# Patient Record
Sex: MALE | Race: WHITE | Employment: OTHER
[De-identification: names, ages, dates, MRNs, and addresses within clinical notes are randomized per-mention and may not be internally consistent; named-entity substitution may affect disease eponyms.]

---

## 2017-01-04 RX ORDER — WARFARIN SODIUM 5 MG/1
TABLET ORAL
Qty: 90 TABLET | Refills: 3 | Status: SHIPPED | OUTPATIENT
Start: 2017-01-04 | End: 2017-09-11 | Stop reason: SDUPTHER

## 2017-01-04 RX ORDER — LISINOPRIL 2.5 MG/1
TABLET ORAL
Qty: 180 TABLET | Refills: 3 | Status: SHIPPED | OUTPATIENT
Start: 2017-01-04 | End: 2017-09-11 | Stop reason: SDUPTHER

## 2017-03-06 ENCOUNTER — TELEPHONE (OUTPATIENT)
Dept: CARDIOLOGY | Facility: CLINIC | Age: 77
End: 2017-03-06

## 2017-03-06 DIAGNOSIS — R07.89 PAIN, CHEST WALL: Primary | ICD-10-CM

## 2017-03-06 RX ORDER — METHYLPREDNISOLONE 4 MG/1
TABLET ORAL
Qty: 21 TABLET | Refills: 0 | Status: SHIPPED | OUTPATIENT
Start: 2017-03-06 | End: 2017-03-28 | Stop reason: ALTCHOICE

## 2017-03-13 ENCOUNTER — TELEPHONE (OUTPATIENT)
Dept: CARDIOLOGY CLINIC | Age: 77
End: 2017-03-13

## 2017-03-28 ENCOUNTER — HOSPITAL ENCOUNTER (OUTPATIENT)
Dept: PHARMACY | Age: 77
Setting detail: THERAPIES SERIES
Discharge: HOME | End: 2017-03-28
Payer: MEDICARE

## 2017-03-28 VITALS
WEIGHT: 171 LBS | DIASTOLIC BLOOD PRESSURE: 82 MMHG | HEART RATE: 68 BPM | BODY MASS INDEX: 26 KG/M2 | SYSTOLIC BLOOD PRESSURE: 126 MMHG

## 2017-03-28 DIAGNOSIS — I63.512 ACUTE ISCHEMIC LEFT MCA STROKE (HCC): ICD-10-CM

## 2017-03-28 LAB — INR BLD: 2.5

## 2017-03-28 PROCEDURE — G0463 HOSPITAL OUTPT CLINIC VISIT: HCPCS

## 2017-03-28 PROCEDURE — 85610 PROTHROMBIN TIME: CPT

## 2017-05-09 ENCOUNTER — HOSPITAL ENCOUNTER (OUTPATIENT)
Dept: PHARMACY | Age: 77
Setting detail: THERAPIES SERIES
Discharge: HOME | End: 2017-05-09
Payer: MEDICARE

## 2017-05-09 VITALS
BODY MASS INDEX: 26.15 KG/M2 | SYSTOLIC BLOOD PRESSURE: 114 MMHG | WEIGHT: 172 LBS | DIASTOLIC BLOOD PRESSURE: 72 MMHG | HEART RATE: 72 BPM

## 2017-05-09 DIAGNOSIS — I63.512 ACUTE ISCHEMIC LEFT MCA STROKE (HCC): ICD-10-CM

## 2017-05-09 LAB — INR BLD: 2.5

## 2017-05-09 PROCEDURE — 85610 PROTHROMBIN TIME: CPT

## 2017-05-09 PROCEDURE — 99211 OFF/OP EST MAY X REQ PHY/QHP: CPT

## 2017-05-09 RX ORDER — POTASSIUM CITRATE 10 MEQ/1
20 TABLET, EXTENDED RELEASE ORAL DAILY
COMMUNITY
Start: 2010-11-12 | End: 2017-11-28 | Stop reason: ALTCHOICE

## 2017-06-20 ENCOUNTER — HOSPITAL ENCOUNTER (OUTPATIENT)
Dept: PHARMACY | Age: 77
Setting detail: THERAPIES SERIES
Discharge: HOME | End: 2017-06-20
Payer: MEDICARE

## 2017-06-20 VITALS
DIASTOLIC BLOOD PRESSURE: 82 MMHG | SYSTOLIC BLOOD PRESSURE: 134 MMHG | BODY MASS INDEX: 26.37 KG/M2 | HEART RATE: 78 BPM | WEIGHT: 173.4 LBS

## 2017-06-20 DIAGNOSIS — I63.512 ACUTE ISCHEMIC LEFT MCA STROKE (HCC): ICD-10-CM

## 2017-06-20 LAB — INR BLD: 2.7

## 2017-06-20 PROCEDURE — 99211 OFF/OP EST MAY X REQ PHY/QHP: CPT

## 2017-06-20 PROCEDURE — 85610 PROTHROMBIN TIME: CPT

## 2017-08-01 ENCOUNTER — HOSPITAL ENCOUNTER (OUTPATIENT)
Dept: PHARMACY | Age: 77
Setting detail: THERAPIES SERIES
Discharge: HOME | End: 2017-08-01
Payer: MEDICARE

## 2017-08-01 VITALS
DIASTOLIC BLOOD PRESSURE: 82 MMHG | WEIGHT: 171.1 LBS | BODY MASS INDEX: 26.02 KG/M2 | HEART RATE: 72 BPM | SYSTOLIC BLOOD PRESSURE: 118 MMHG

## 2017-08-01 DIAGNOSIS — I63.512 ACUTE ISCHEMIC LEFT MCA STROKE (HCC): ICD-10-CM

## 2017-08-01 LAB — INR BLD: 2.3

## 2017-08-01 PROCEDURE — 85610 PROTHROMBIN TIME: CPT

## 2017-08-01 PROCEDURE — 99211 OFF/OP EST MAY X REQ PHY/QHP: CPT

## 2017-09-11 RX ORDER — LISINOPRIL 2.5 MG/1
TABLET ORAL
Qty: 180 TABLET | Refills: 3 | Status: SHIPPED | OUTPATIENT
Start: 2017-09-11 | End: 2018-01-02 | Stop reason: SDUPTHER

## 2017-09-11 RX ORDER — WARFARIN SODIUM 5 MG/1
TABLET ORAL
Qty: 90 TABLET | Refills: 3 | Status: SHIPPED | OUTPATIENT
Start: 2017-09-11 | End: 2017-10-23 | Stop reason: SDUPTHER

## 2017-09-12 ENCOUNTER — HOSPITAL ENCOUNTER (OUTPATIENT)
Dept: PHARMACY | Age: 77
Setting detail: THERAPIES SERIES
Discharge: HOME | End: 2017-09-12
Payer: MEDICARE

## 2017-09-12 VITALS
DIASTOLIC BLOOD PRESSURE: 84 MMHG | SYSTOLIC BLOOD PRESSURE: 155 MMHG | WEIGHT: 170.5 LBS | BODY MASS INDEX: 25.92 KG/M2 | HEART RATE: 72 BPM

## 2017-09-12 DIAGNOSIS — I63.512 ACUTE ISCHEMIC LEFT MCA STROKE (HCC): ICD-10-CM

## 2017-09-12 LAB — INR BLD: 2.3

## 2017-09-12 PROCEDURE — 85610 PROTHROMBIN TIME: CPT

## 2017-09-12 PROCEDURE — 99211 OFF/OP EST MAY X REQ PHY/QHP: CPT

## 2017-10-23 ENCOUNTER — HOSPITAL ENCOUNTER (OUTPATIENT)
Dept: PHARMACY | Age: 77
Setting detail: THERAPIES SERIES
Discharge: HOME | End: 2017-10-23
Payer: MEDICARE

## 2017-10-23 VITALS
HEART RATE: 86 BPM | WEIGHT: 173.6 LBS | BODY MASS INDEX: 26.4 KG/M2 | DIASTOLIC BLOOD PRESSURE: 82 MMHG | SYSTOLIC BLOOD PRESSURE: 140 MMHG

## 2017-10-23 DIAGNOSIS — I63.512 ACUTE ISCHEMIC LEFT MCA STROKE (HCC): ICD-10-CM

## 2017-10-23 LAB — INR BLD: 1.7

## 2017-10-23 PROCEDURE — 85610 PROTHROMBIN TIME: CPT

## 2017-10-23 PROCEDURE — 99211 OFF/OP EST MAY X REQ PHY/QHP: CPT

## 2017-10-23 RX ORDER — WARFARIN SODIUM 5 MG/1
TABLET ORAL
Qty: 90 TABLET | Refills: 3 | Status: SHIPPED
Start: 2017-10-23 | End: 2018-01-02 | Stop reason: SDUPTHER

## 2017-11-09 ENCOUNTER — TELEPHONE (OUTPATIENT)
Dept: CARDIOLOGY CLINIC | Age: 77
End: 2017-11-09

## 2017-11-10 ENCOUNTER — TELEPHONE (OUTPATIENT)
Dept: PHARMACY | Age: 77
End: 2017-11-10

## 2017-11-10 NOTE — TELEPHONE ENCOUNTER
Jovan Carter will have a tooth extracted at Dr. Gonzales/Hamilton's office in the near future. Since Jovan Carter has a history of stroke and would need to be bridged with therapeutic Lovenox if he went off his warfarin, I have recommended that we not stop the warfarin prior to tooth extraction. We will check his INR within 24 hours prior to the tooth extraction to ensure his INR is not supra-therapeutic prior to the procedure to minimize the risk for bleeding.

## 2017-11-10 NOTE — TELEPHONE ENCOUNTER
Contacted coumadin clinic talked with Benjamin Coreas  If needing held will be bridge Benjamin Coreas will Call [employer] and [employer]

## 2017-11-27 ENCOUNTER — HOSPITAL ENCOUNTER (OUTPATIENT)
Dept: GENERAL RADIOLOGY | Age: 77
Discharge: HOME | End: 2017-11-27
Payer: MEDICARE

## 2017-11-27 ENCOUNTER — HOSPITAL ENCOUNTER (OUTPATIENT)
Age: 77
Discharge: HOME | End: 2017-11-27
Payer: MEDICARE

## 2017-11-27 DIAGNOSIS — E55.9 VITAMIN D DEFICIENCY DISEASE: ICD-10-CM

## 2017-11-27 DIAGNOSIS — I10 ESSENTIAL HYPERTENSION: ICD-10-CM

## 2017-11-27 DIAGNOSIS — Z95.0 PACEMAKER: ICD-10-CM

## 2017-11-27 DIAGNOSIS — I48.91 ATRIAL FIBRILLATION, UNSPECIFIED TYPE (HCC): ICD-10-CM

## 2017-11-27 DIAGNOSIS — E78.5 HYPERLIPIDEMIA, UNSPECIFIED: ICD-10-CM

## 2017-11-27 LAB
ABSOLUTE EOS #: 0.2 K/UL (ref 0–0.4)
ABSOLUTE IMMATURE GRANULOCYTE: NORMAL K/UL (ref 0–0.3)
ABSOLUTE LYMPH #: 1.4 K/UL (ref 1–4.8)
ABSOLUTE MONO #: 0.6 K/UL (ref 0–1)
ALBUMIN SERPL-MCNC: 4 G/DL (ref 3.5–5.2)
ALBUMIN/GLOBULIN RATIO: NORMAL (ref 1–2.5)
ALP BLD-CCNC: 44 U/L (ref 40–129)
ALT SERPL-CCNC: 17 U/L (ref 5–41)
ANION GAP SERPL CALCULATED.3IONS-SCNC: 11 MMOL/L (ref 9–17)
AST SERPL-CCNC: 22 U/L
BASOPHILS # BLD: 0 % (ref 0–2)
BASOPHILS ABSOLUTE: 0 K/UL (ref 0–0.2)
BILIRUB SERPL-MCNC: 0.79 MG/DL (ref 0.3–1.2)
BUN BLDV-MCNC: 18 MG/DL (ref 8–23)
BUN/CREAT BLD: 17 (ref 9–20)
CALCIUM SERPL-MCNC: 9.6 MG/DL (ref 8.6–10.4)
CHLORIDE BLD-SCNC: 102 MMOL/L (ref 98–107)
CHOLESTEROL/HDL RATIO: 5
CHOLESTEROL: 200 MG/DL
CO2: 28 MMOL/L (ref 20–31)
CREAT SERPL-MCNC: 1.07 MG/DL (ref 0.7–1.2)
DIFFERENTIAL TYPE: YES
EKG ATRIAL RATE: 75 BPM
EKG P AXIS: 88 DEGREES
EKG P-R INTERVAL: 238 MS
EKG Q-T INTERVAL: 416 MS
EKG QRS DURATION: 152 MS
EKG QTC CALCULATION (BAZETT): 464 MS
EKG R AXIS: 15 DEGREES
EKG T AXIS: 9 DEGREES
EKG VENTRICULAR RATE: 75 BPM
EOSINOPHILS RELATIVE PERCENT: 3 % (ref 0–5)
GFR AFRICAN AMERICAN: >60 ML/MIN
GFR NON-AFRICAN AMERICAN: >60 ML/MIN
GFR SERPL CREATININE-BSD FRML MDRD: NORMAL ML/MIN/{1.73_M2}
GFR SERPL CREATININE-BSD FRML MDRD: NORMAL ML/MIN/{1.73_M2}
GLUCOSE BLD-MCNC: 98 MG/DL (ref 70–99)
HCT VFR BLD CALC: 47.7 % (ref 41–53)
HDLC SERPL-MCNC: 40 MG/DL
HEMOGLOBIN: 15.6 G/DL (ref 13.5–17.5)
IMMATURE GRANULOCYTES: NORMAL %
LDL CHOLESTEROL: 142 MG/DL (ref 0–130)
LYMPHOCYTES # BLD: 20 % (ref 13–44)
MAGNESIUM: 2.2 MG/DL (ref 1.6–2.6)
MCH RBC QN AUTO: 28.7 PG (ref 26–34)
MCHC RBC AUTO-ENTMCNC: 32.8 G/DL (ref 31–37)
MCV RBC AUTO: 87.6 FL (ref 80–100)
MONOCYTES # BLD: 8 % (ref 5–9)
PATIENT FASTING?: YES
PDW BLD-RTO: 13.9 % (ref 12.1–15.2)
PLATELET # BLD: 161 K/UL (ref 140–450)
PLATELET ESTIMATE: NORMAL
PMV BLD AUTO: NORMAL FL (ref 6–12)
POTASSIUM SERPL-SCNC: 4.2 MMOL/L (ref 3.7–5.3)
RBC # BLD: 5.45 M/UL (ref 4.5–5.9)
RBC # BLD: NORMAL 10*6/UL
SEG NEUTROPHILS: 69 % (ref 39–75)
SEGMENTED NEUTROPHILS ABSOLUTE COUNT: 5.1 K/UL (ref 2.1–6.5)
SODIUM BLD-SCNC: 141 MMOL/L (ref 135–144)
TOTAL PROTEIN: 7.1 G/DL (ref 6.4–8.3)
TRIGL SERPL-MCNC: 89 MG/DL
TSH SERPL DL<=0.05 MIU/L-ACNC: 1.75 MIU/L (ref 0.3–5)
VITAMIN D 25-HYDROXY: 35.1 NG/ML (ref 30–100)
VLDLC SERPL CALC-MCNC: ABNORMAL MG/DL (ref 1–30)
WBC # BLD: 7.3 K/UL (ref 3.5–11)
WBC # BLD: NORMAL 10*3/UL

## 2017-11-27 PROCEDURE — 80053 COMPREHEN METABOLIC PANEL: CPT

## 2017-11-27 PROCEDURE — 80061 LIPID PANEL: CPT

## 2017-11-27 PROCEDURE — 71020 XR CHEST STANDARD TWO VW: CPT

## 2017-11-27 PROCEDURE — 84443 ASSAY THYROID STIM HORMONE: CPT

## 2017-11-27 PROCEDURE — 36415 COLL VENOUS BLD VENIPUNCTURE: CPT

## 2017-11-27 PROCEDURE — 85025 COMPLETE CBC W/AUTO DIFF WBC: CPT

## 2017-11-27 PROCEDURE — 93005 ELECTROCARDIOGRAM TRACING: CPT

## 2017-11-27 PROCEDURE — 83735 ASSAY OF MAGNESIUM: CPT

## 2017-11-27 PROCEDURE — 82306 VITAMIN D 25 HYDROXY: CPT

## 2017-11-28 ENCOUNTER — HOSPITAL ENCOUNTER (OUTPATIENT)
Dept: PHARMACY | Age: 77
Setting detail: THERAPIES SERIES
Discharge: HOME | End: 2017-11-28
Payer: MEDICARE

## 2017-11-28 ENCOUNTER — OFFICE VISIT (OUTPATIENT)
Dept: CARDIOLOGY CLINIC | Age: 77
End: 2017-11-28
Payer: MEDICARE

## 2017-11-28 VITALS
DIASTOLIC BLOOD PRESSURE: 86 MMHG | SYSTOLIC BLOOD PRESSURE: 124 MMHG | WEIGHT: 172.9 LBS | BODY MASS INDEX: 26.29 KG/M2 | HEART RATE: 80 BPM

## 2017-11-28 VITALS
WEIGHT: 171 LBS | DIASTOLIC BLOOD PRESSURE: 90 MMHG | BODY MASS INDEX: 26 KG/M2 | OXYGEN SATURATION: 96 % | HEART RATE: 78 BPM | SYSTOLIC BLOOD PRESSURE: 160 MMHG

## 2017-11-28 DIAGNOSIS — I63.512 ACUTE ISCHEMIC LEFT MCA STROKE (HCC): ICD-10-CM

## 2017-11-28 DIAGNOSIS — E78.49 OTHER HYPERLIPIDEMIA: Primary | ICD-10-CM

## 2017-11-28 DIAGNOSIS — E55.9 VITAMIN D DEFICIENCY DISEASE: ICD-10-CM

## 2017-11-28 DIAGNOSIS — I48.20 CHRONIC ATRIAL FIBRILLATION (HCC): ICD-10-CM

## 2017-11-28 DIAGNOSIS — Z95.0 PACEMAKER: ICD-10-CM

## 2017-11-28 DIAGNOSIS — I10 ESSENTIAL HYPERTENSION: ICD-10-CM

## 2017-11-28 LAB — INR BLD: 2.3

## 2017-11-28 PROCEDURE — G8484 FLU IMMUNIZE NO ADMIN: HCPCS | Performed by: INTERNAL MEDICINE

## 2017-11-28 PROCEDURE — G8417 CALC BMI ABV UP PARAM F/U: HCPCS | Performed by: INTERNAL MEDICINE

## 2017-11-28 PROCEDURE — 1123F ACP DISCUSS/DSCN MKR DOCD: CPT | Performed by: INTERNAL MEDICINE

## 2017-11-28 PROCEDURE — G8427 DOCREV CUR MEDS BY ELIG CLIN: HCPCS | Performed by: INTERNAL MEDICINE

## 2017-11-28 PROCEDURE — 99214 OFFICE O/P EST MOD 30 MIN: CPT | Performed by: INTERNAL MEDICINE

## 2017-11-28 PROCEDURE — G8598 ASA/ANTIPLAT THER USED: HCPCS | Performed by: INTERNAL MEDICINE

## 2017-11-28 PROCEDURE — 85610 PROTHROMBIN TIME: CPT

## 2017-11-28 PROCEDURE — 4040F PNEUMOC VAC/ADMIN/RCVD: CPT | Performed by: INTERNAL MEDICINE

## 2017-11-28 PROCEDURE — 93288 INTERROG EVL PM/LDLS PM IP: CPT | Performed by: INTERNAL MEDICINE

## 2017-11-28 PROCEDURE — 99211 OFF/OP EST MAY X REQ PHY/QHP: CPT

## 2017-11-28 PROCEDURE — 1036F TOBACCO NON-USER: CPT | Performed by: INTERNAL MEDICINE

## 2017-11-28 NOTE — PROGRESS NOTES
Ov DR Donte Tam 1 year follow up  No chest pain or sob   pacemaker checked per medtronic  No hospitalizations or procedures  Since seen  Have tooth extraction done next week  Not stopping coumadin  Will see in 1 year will   Check pacer in 6 mths

## 2017-11-28 NOTE — PROGRESS NOTES
Fingerstick INR drawn per clinic protocol. Patient states no visible blood in urine and no black tarry stool. Denies any missed doses of warfarin. No change in diet, although Asia Sneed and Prescilla Gosselin have increased their meat consumption slightly. All medications reviewed without the bottles for accuracy. Asia Sneed is no longer using laxatives. Asia Sneed was on amoxicillin 250 mg 4 times daily for an infected tooth, but has been done with that for a \"few weeks. \"  Asia Sneed will see Dr. David Dumont after our visit today. Asia Sneed will have 1 tooth extraction by Dr. Melissa Spears on 12/5/17, warfarin will not be stopped prior to the procedure. Will continue current warfarin regimen and recheck INR in 1 week(s), 24 hours prior to tooth extraction on 12/5/17.

## 2017-12-01 NOTE — PROGRESS NOTES
Obey Arreola M.D. [address] [place], [place] [address]Felicity   [phone]          2017          Dale Banerjee DO  [facility] [address] [place] [address]    RE:  Trey Anna  : 1940    Dear Dr. Jeet Hernandez:    CHIEF COMPLAINT:  1. Paroxysmal atrial fibrillation. 2.  CVA. HISTORY OF PRESENT ILLNESS:  I had the pleasure of seeing Mr. Martha Burt in our office on 2017. He is a pleasant 44-year-old gentleman who has sick sinus syndrome with paroxysmal atrial fibrillation with RVR. Because of recurrent atrial fibrillation and bradycardia, he had a Medtronic MRI-compatible DDD pacemaker implanted on 2013 and has been on Toprol since that time. He was placed on Coumadin for paroxysmal atrial fibrillation. He also has a history of severe labile hypertension. On , he came in the emergency room with a CVA. He had stopped his Coumadin and switched to herbal supplements. He had a prolonged recovery but was able to regain his speech. He has mild weakness of his right arm but otherwise has been doing well. He has been on lisinopril, which he decreased to 2.5 mg once a day 2 years ago. He is on no statin despite his severe hyperlipidemia. He has never had a myocardial infarction or cardiac catheterizations. He has done well over the past year. He had a community screening for carotid and aortic disease, which was negative. He denies any chest pain or chest discomfort and remains active. His wife tries to limit his activities such as not climbing on the roof, but he manages to escape and do garcia, etc.    He has had no unusual shortness of breath. No PND or orthopnea. No syncope or near syncope. No lightheadedness or dizziness. Denies any palpitations. His pacemaker was checked and is still at beginning of life. He is atrial pacing 100% of the time with no ventricular pacing.     CARDIAC bruits. No lymphadenopathy or thyromegaly. CARDIOVASCULAR EXAM:  S1 and S2 were normal.  No S3 or S4. Soft systolic blowing type murmur. No diastolic murmur. PMI was normal.  No lift, thrust, or pericardial friction rub. LUNGS:  Quite clear to auscultation and percussion. ABDOMEN:  Soft and nontender. Good bowel sounds. The aorta was not enlarged. No hepatomegaly, splenomegaly. EXTREMITIES:  Good femoral pulses. Good pedal pulses. No pedal edema. Skin was warm and dry. No calf tenderness. Nail beds pink. Good cap refill. PULSES:  Bilateral symmetrical radial, brachial and carotid pulses. No carotid bruits. Good femoral and pedal pulses. NEUROLOGIC EXAM:  Within normal limits. PSYCHIATRIC EXAM:  Within normal limits. LABORATORY DATA:  From 11/27, sodium 141, potassium 4.2, BUN was 18, creatinine 1.07, GFR greater than 60, magnesium 2.2. Cholesterol 200 with an HDL of 40,  with triglycerides 89. ALT was 17, AST was 22. TSH was 1.75. Vitamin D was 35.1. White count 7.3, hemoglobin 15.7 with a platelet count of 872,370. EKG showed atrial paced rhythm with an intrinsic ventricular response with nonspecific ST changes and a right bundle-branch block. Chest x-ray was unremarkable. His pacer was interrogated. He has been atrial pacing 100% of the time. IMPRESSION:  1. Sick sinus syndrome. 2.  Status post MRI pacemaker implanted on 11/20/2013 with appropriate sensing and pacing, with him atrial pacing 100% of the time and ventricular pacing 0% of the time. 3.  Paroxysmal atrial fibrillation, on Coumadin. 4.  CVA on 04/19/2015 after he had stopped his Coumadin, was transferred to [facility] with dysphagia, which has resolved with mild weakness of his right arm.  5.  Hypertension, very labile. 6.  Normal LV function by history. 7.  Hyperlipidemia, untreated by his choice. 8.  Ventral hernia. PLAN:  1. See in 1 year in followup.   2.  Continue the same medications. DISCUSSION:  Mr. Ursula Dial overall is doing well. He has had no chest pain or chest discomfort to indicate we need to do any other testing at this time. He is essentially pacer-dependent pacing atrially 100% of the time. He does have multiple risk factors of CAD but again is asymptomatic and he does not wish to be on any statins for lipid management. I will see him in 1 year in followup. We will check his pacemaker in 6 months. If he would have any unusual shortness of breath, etc., I would want to see him immediately. Thank you very much for allowing me the privilege of seeing Mr. Ursula Dial. If you have any questions on my thoughts, please do not hesitate to contact me.     Sincerely,        Willow Johnson    D: 11/28/2017 9:27:43       T: 11/28/2017 12:23:45     [id number]  Job#: [id number]     Doc#: [id number]

## 2017-12-04 ENCOUNTER — HOSPITAL ENCOUNTER (OUTPATIENT)
Dept: PHARMACY | Age: 77
Setting detail: THERAPIES SERIES
Discharge: HOME | End: 2017-12-04
Payer: MEDICARE

## 2017-12-04 VITALS
HEART RATE: 77 BPM | DIASTOLIC BLOOD PRESSURE: 82 MMHG | BODY MASS INDEX: 26.46 KG/M2 | SYSTOLIC BLOOD PRESSURE: 137 MMHG | WEIGHT: 174 LBS

## 2017-12-04 DIAGNOSIS — I63.512 ACUTE ISCHEMIC LEFT MCA STROKE (HCC): ICD-10-CM

## 2017-12-04 LAB — INR BLD: 2.2

## 2017-12-04 PROCEDURE — 85610 PROTHROMBIN TIME: CPT

## 2017-12-04 PROCEDURE — 99211 OFF/OP EST MAY X REQ PHY/QHP: CPT

## 2017-12-04 NOTE — PROGRESS NOTES
Fingerstick INR drawn per clinic protocol. Patient states no visible blood in urine and no black tarry stool. Denies any missed doses of warfarin. No change in other maintenance medications or in diet. Donny Cabrera is preparing for a single tooth extraction with Dr. Maren Carolina tomorrow. Since Diego's INR is on the lower end of therapeutic, he is okay to have tooth extraction and will take warfarin 5 mg tonight, then will continue current weekly warfarin regimen and recheck INR in 6 week(s).

## 2018-01-02 RX ORDER — LISINOPRIL 2.5 MG/1
TABLET ORAL
Qty: 180 TABLET | Refills: 3 | Status: SHIPPED | OUTPATIENT
Start: 2018-01-02 | End: 2019-01-15 | Stop reason: SDUPTHER

## 2018-01-02 RX ORDER — WARFARIN SODIUM 5 MG/1
TABLET ORAL
Qty: 90 TABLET | Refills: 3 | Status: SHIPPED | OUTPATIENT
Start: 2018-01-02 | End: 2018-05-22 | Stop reason: DRUGHIGH

## 2018-01-16 ENCOUNTER — HOSPITAL ENCOUNTER (OUTPATIENT)
Dept: PHARMACY | Age: 78
Setting detail: THERAPIES SERIES
Discharge: HOME | End: 2018-01-16
Payer: MEDICARE

## 2018-01-16 VITALS
HEART RATE: 73 BPM | DIASTOLIC BLOOD PRESSURE: 85 MMHG | BODY MASS INDEX: 26.65 KG/M2 | SYSTOLIC BLOOD PRESSURE: 127 MMHG | WEIGHT: 175.3 LBS

## 2018-01-16 DIAGNOSIS — I63.119 CEREBRAL INFARCTION DUE TO EMBOLISM OF VERTEBRAL ARTERY, UNSPECIFIED BLOOD VESSEL LATERALITY (HCC): ICD-10-CM

## 2018-01-16 DIAGNOSIS — I63.139 CEREBRAL INFARCTION DUE TO EMBOLISM OF CAROTID ARTERY, UNSPECIFIED BLOOD VESSEL LATERALITY (HCC): ICD-10-CM

## 2018-01-16 DIAGNOSIS — I63.512 ACUTE ISCHEMIC LEFT MCA STROKE (HCC): ICD-10-CM

## 2018-01-16 DIAGNOSIS — I48.91 ATRIAL FIBRILLATION, UNSPECIFIED TYPE (HCC): ICD-10-CM

## 2018-01-16 LAB — INR BLD: 1.9

## 2018-01-16 PROCEDURE — 85610 PROTHROMBIN TIME: CPT

## 2018-01-16 PROCEDURE — 99211 OFF/OP EST MAY X REQ PHY/QHP: CPT

## 2018-02-27 ENCOUNTER — HOSPITAL ENCOUNTER (OUTPATIENT)
Dept: PHARMACY | Age: 78
Setting detail: THERAPIES SERIES
Discharge: HOME | End: 2018-02-27
Payer: MEDICARE

## 2018-02-27 VITALS
SYSTOLIC BLOOD PRESSURE: 132 MMHG | DIASTOLIC BLOOD PRESSURE: 74 MMHG | BODY MASS INDEX: 26.72 KG/M2 | HEART RATE: 70 BPM | WEIGHT: 175.7 LBS

## 2018-02-27 DIAGNOSIS — I63.512 ACUTE ISCHEMIC LEFT MCA STROKE (HCC): ICD-10-CM

## 2018-02-27 DIAGNOSIS — I48.91 ATRIAL FIBRILLATION, UNSPECIFIED TYPE (HCC): ICD-10-CM

## 2018-02-27 DIAGNOSIS — I63.119 CEREBRAL INFARCTION DUE TO EMBOLISM OF VERTEBRAL ARTERY, UNSPECIFIED BLOOD VESSEL LATERALITY (HCC): ICD-10-CM

## 2018-02-27 DIAGNOSIS — I63.139 CEREBRAL INFARCTION DUE TO EMBOLISM OF CAROTID ARTERY, UNSPECIFIED BLOOD VESSEL LATERALITY (HCC): ICD-10-CM

## 2018-02-27 LAB — INR BLD: 2

## 2018-02-27 PROCEDURE — 99211 OFF/OP EST MAY X REQ PHY/QHP: CPT

## 2018-02-27 PROCEDURE — 85610 PROTHROMBIN TIME: CPT

## 2018-04-10 ENCOUNTER — HOSPITAL ENCOUNTER (OUTPATIENT)
Dept: PHARMACY | Age: 78
Setting detail: THERAPIES SERIES
Discharge: HOME | End: 2018-04-10
Payer: MEDICARE

## 2018-04-10 VITALS
HEART RATE: 76 BPM | SYSTOLIC BLOOD PRESSURE: 120 MMHG | WEIGHT: 174.4 LBS | BODY MASS INDEX: 26.52 KG/M2 | DIASTOLIC BLOOD PRESSURE: 64 MMHG

## 2018-04-10 DIAGNOSIS — I48.91 ATRIAL FIBRILLATION, UNSPECIFIED TYPE (HCC): ICD-10-CM

## 2018-04-10 DIAGNOSIS — I63.119 CEREBRAL INFARCTION DUE TO EMBOLISM OF VERTEBRAL ARTERY, UNSPECIFIED BLOOD VESSEL LATERALITY (HCC): ICD-10-CM

## 2018-04-10 DIAGNOSIS — I63.139 CEREBRAL INFARCTION DUE TO EMBOLISM OF CAROTID ARTERY, UNSPECIFIED BLOOD VESSEL LATERALITY (HCC): ICD-10-CM

## 2018-04-10 DIAGNOSIS — I63.512 ACUTE ISCHEMIC LEFT MCA STROKE (HCC): ICD-10-CM

## 2018-04-10 LAB — INR BLD: 2.5

## 2018-04-10 PROCEDURE — 85610 PROTHROMBIN TIME: CPT

## 2018-04-10 PROCEDURE — 99211 OFF/OP EST MAY X REQ PHY/QHP: CPT

## 2018-05-22 ENCOUNTER — HOSPITAL ENCOUNTER (OUTPATIENT)
Dept: PHARMACY | Age: 78
Setting detail: THERAPIES SERIES
Discharge: HOME | End: 2018-05-22
Payer: MEDICARE

## 2018-05-22 VITALS
WEIGHT: 172.7 LBS | DIASTOLIC BLOOD PRESSURE: 86 MMHG | SYSTOLIC BLOOD PRESSURE: 138 MMHG | HEART RATE: 80 BPM | BODY MASS INDEX: 26.26 KG/M2

## 2018-05-22 DIAGNOSIS — I63.119 CEREBRAL INFARCTION DUE TO EMBOLISM OF VERTEBRAL ARTERY, UNSPECIFIED BLOOD VESSEL LATERALITY (HCC): ICD-10-CM

## 2018-05-22 DIAGNOSIS — I63.512 ACUTE ISCHEMIC LEFT MCA STROKE (HCC): ICD-10-CM

## 2018-05-22 DIAGNOSIS — I48.91 ATRIAL FIBRILLATION, UNSPECIFIED TYPE (HCC): ICD-10-CM

## 2018-05-22 DIAGNOSIS — I63.139 CEREBRAL INFARCTION DUE TO EMBOLISM OF CAROTID ARTERY, UNSPECIFIED BLOOD VESSEL LATERALITY (HCC): ICD-10-CM

## 2018-05-22 LAB — INR BLD: 3.7

## 2018-05-22 PROCEDURE — 99211 OFF/OP EST MAY X REQ PHY/QHP: CPT

## 2018-05-22 PROCEDURE — 85610 PROTHROMBIN TIME: CPT

## 2018-05-22 RX ORDER — WARFARIN SODIUM 5 MG/1
TABLET ORAL
Qty: 90 TABLET | Refills: 3 | Status: SHIPPED
Start: 2018-05-22 | End: 2019-01-15 | Stop reason: SDUPTHER

## 2018-06-19 ENCOUNTER — HOSPITAL ENCOUNTER (OUTPATIENT)
Dept: PHARMACY | Age: 78
Setting detail: THERAPIES SERIES
Discharge: HOME | End: 2018-06-19
Payer: MEDICARE

## 2018-06-19 VITALS
WEIGHT: 173.8 LBS | HEART RATE: 72 BPM | DIASTOLIC BLOOD PRESSURE: 84 MMHG | BODY MASS INDEX: 26.43 KG/M2 | SYSTOLIC BLOOD PRESSURE: 142 MMHG

## 2018-06-19 DIAGNOSIS — I48.91 ATRIAL FIBRILLATION, UNSPECIFIED TYPE (HCC): ICD-10-CM

## 2018-06-19 DIAGNOSIS — I63.512 ACUTE ISCHEMIC LEFT MCA STROKE (HCC): ICD-10-CM

## 2018-06-19 DIAGNOSIS — I63.119 CEREBRAL INFARCTION DUE TO EMBOLISM OF VERTEBRAL ARTERY, UNSPECIFIED BLOOD VESSEL LATERALITY (HCC): ICD-10-CM

## 2018-06-19 DIAGNOSIS — I63.139 CEREBRAL INFARCTION DUE TO EMBOLISM OF CAROTID ARTERY, UNSPECIFIED BLOOD VESSEL LATERALITY (HCC): ICD-10-CM

## 2018-06-19 LAB — INR BLD: 2.9

## 2018-06-19 PROCEDURE — 85610 PROTHROMBIN TIME: CPT

## 2018-06-19 PROCEDURE — 99211 OFF/OP EST MAY X REQ PHY/QHP: CPT

## 2018-07-31 ENCOUNTER — HOSPITAL ENCOUNTER (OUTPATIENT)
Dept: PHARMACY | Age: 78
Setting detail: THERAPIES SERIES
Discharge: HOME | End: 2018-07-31
Payer: MEDICARE

## 2018-07-31 VITALS
HEART RATE: 74 BPM | DIASTOLIC BLOOD PRESSURE: 88 MMHG | SYSTOLIC BLOOD PRESSURE: 146 MMHG | BODY MASS INDEX: 25.89 KG/M2 | WEIGHT: 170.3 LBS

## 2018-07-31 DIAGNOSIS — I63.119 CEREBRAL INFARCTION DUE TO EMBOLISM OF VERTEBRAL ARTERY, UNSPECIFIED BLOOD VESSEL LATERALITY (HCC): ICD-10-CM

## 2018-07-31 DIAGNOSIS — I63.139 CEREBRAL INFARCTION DUE TO EMBOLISM OF CAROTID ARTERY, UNSPECIFIED BLOOD VESSEL LATERALITY (HCC): ICD-10-CM

## 2018-07-31 DIAGNOSIS — I48.91 ATRIAL FIBRILLATION, UNSPECIFIED TYPE (HCC): ICD-10-CM

## 2018-07-31 DIAGNOSIS — I63.512 ACUTE ISCHEMIC LEFT MCA STROKE (HCC): ICD-10-CM

## 2018-07-31 LAB — INR BLD: 2.8

## 2018-07-31 PROCEDURE — 99211 OFF/OP EST MAY X REQ PHY/QHP: CPT

## 2018-07-31 PROCEDURE — 85610 PROTHROMBIN TIME: CPT

## 2018-09-11 ENCOUNTER — HOSPITAL ENCOUNTER (OUTPATIENT)
Dept: PHARMACY | Age: 78
Setting detail: THERAPIES SERIES
Discharge: HOME | End: 2018-09-11
Payer: MEDICARE

## 2018-09-11 VITALS
DIASTOLIC BLOOD PRESSURE: 90 MMHG | HEART RATE: 76 BPM | BODY MASS INDEX: 26.03 KG/M2 | WEIGHT: 171.2 LBS | SYSTOLIC BLOOD PRESSURE: 148 MMHG

## 2018-09-11 DIAGNOSIS — I63.512 ACUTE ISCHEMIC LEFT MCA STROKE (HCC): ICD-10-CM

## 2018-09-11 DIAGNOSIS — I48.91 ATRIAL FIBRILLATION, UNSPECIFIED TYPE (HCC): ICD-10-CM

## 2018-09-11 DIAGNOSIS — I63.119 CEREBRAL INFARCTION DUE TO EMBOLISM OF VERTEBRAL ARTERY, UNSPECIFIED BLOOD VESSEL LATERALITY (HCC): ICD-10-CM

## 2018-09-11 DIAGNOSIS — I63.139 CEREBRAL INFARCTION DUE TO EMBOLISM OF CAROTID ARTERY, UNSPECIFIED BLOOD VESSEL LATERALITY (HCC): ICD-10-CM

## 2018-09-11 LAB — INR BLD: 2.7

## 2018-09-11 PROCEDURE — 99211 OFF/OP EST MAY X REQ PHY/QHP: CPT

## 2018-09-11 PROCEDURE — 85610 PROTHROMBIN TIME: CPT

## 2018-09-11 NOTE — PROGRESS NOTES
Fingerstick INR drawn per clinic protocol. Patient states no visible blood in urine and no black tarry stool. Raquel Resendez states he has been having \"trouble\" with his hemorrhoids bleeding. Denies any missed doses of warfarin. No change in other maintenance medications or in diet. Will continue current weekly warfarin regimen and recheck INR in 6 week(s). Patient acknowledges working in consult agreement with pharmacist as referred by his/her physician.

## 2018-10-23 ENCOUNTER — HOSPITAL ENCOUNTER (OUTPATIENT)
Dept: PHARMACY | Age: 78
Setting detail: THERAPIES SERIES
Discharge: HOME | End: 2018-10-23
Payer: MEDICARE

## 2018-10-23 VITALS
BODY MASS INDEX: 25.85 KG/M2 | DIASTOLIC BLOOD PRESSURE: 82 MMHG | SYSTOLIC BLOOD PRESSURE: 132 MMHG | HEART RATE: 72 BPM | WEIGHT: 170 LBS

## 2018-10-23 DIAGNOSIS — I63.139 CEREBRAL INFARCTION DUE TO EMBOLISM OF CAROTID ARTERY, UNSPECIFIED BLOOD VESSEL LATERALITY (HCC): ICD-10-CM

## 2018-10-23 DIAGNOSIS — I48.91 ATRIAL FIBRILLATION, UNSPECIFIED TYPE (HCC): ICD-10-CM

## 2018-10-23 DIAGNOSIS — I63.119 CEREBRAL INFARCTION DUE TO EMBOLISM OF VERTEBRAL ARTERY, UNSPECIFIED BLOOD VESSEL LATERALITY (HCC): ICD-10-CM

## 2018-10-23 DIAGNOSIS — I63.512 ACUTE ISCHEMIC LEFT MCA STROKE (HCC): ICD-10-CM

## 2018-10-23 LAB — INR BLD: 2.7

## 2018-10-23 PROCEDURE — 85610 PROTHROMBIN TIME: CPT

## 2018-10-23 PROCEDURE — 99211 OFF/OP EST MAY X REQ PHY/QHP: CPT

## 2018-10-23 NOTE — PROGRESS NOTES
Fingerstick INR drawn per clinic protocol. Patient states no visible blood in urine and no black tarry stool. Denies any missed doses of warfarin. No change in other maintenance medications or in diet. Will continue current warfarin regimen and recheck INR in 6 weeks. Patient acknowledges working in consult agreement with pharmacist as referred by his/her physician.

## 2018-11-12 ENCOUNTER — HOSPITAL ENCOUNTER (OUTPATIENT)
Dept: GENERAL RADIOLOGY | Age: 78
Discharge: HOME | End: 2018-11-14
Payer: MEDICARE

## 2018-11-12 ENCOUNTER — HOSPITAL ENCOUNTER (OUTPATIENT)
Age: 78
Discharge: HOME | End: 2018-11-12
Payer: MEDICARE

## 2018-11-12 ENCOUNTER — HOSPITAL ENCOUNTER (OUTPATIENT)
Age: 78
Discharge: HOME | End: 2018-11-14
Payer: MEDICARE

## 2018-11-12 DIAGNOSIS — E78.49 OTHER HYPERLIPIDEMIA: ICD-10-CM

## 2018-11-12 DIAGNOSIS — I48.20 CHRONIC ATRIAL FIBRILLATION (HCC): ICD-10-CM

## 2018-11-12 DIAGNOSIS — I10 ESSENTIAL HYPERTENSION: ICD-10-CM

## 2018-11-12 DIAGNOSIS — E55.9 VITAMIN D DEFICIENCY DISEASE: ICD-10-CM

## 2018-11-12 DIAGNOSIS — Z95.0 PACEMAKER: ICD-10-CM

## 2018-11-12 LAB
ABSOLUTE EOS #: 0.1 K/UL (ref 0–0.4)
ABSOLUTE IMMATURE GRANULOCYTE: ABNORMAL K/UL (ref 0–0.3)
ABSOLUTE LYMPH #: 2 K/UL (ref 1–4.8)
ABSOLUTE MONO #: 1 K/UL (ref 0–1)
ALBUMIN SERPL-MCNC: 4 G/DL (ref 3.5–5.2)
ALBUMIN/GLOBULIN RATIO: ABNORMAL (ref 1–2.5)
ALP BLD-CCNC: 51 U/L (ref 40–129)
ALT SERPL-CCNC: 16 U/L (ref 5–41)
ANION GAP SERPL CALCULATED.3IONS-SCNC: 9 MMOL/L (ref 9–17)
AST SERPL-CCNC: 19 U/L
BASOPHILS # BLD: 1 % (ref 0–2)
BASOPHILS ABSOLUTE: 0.1 K/UL (ref 0–0.2)
BILIRUB SERPL-MCNC: 0.37 MG/DL (ref 0.3–1.2)
BUN BLDV-MCNC: 25 MG/DL (ref 8–23)
BUN/CREAT BLD: 23 (ref 9–20)
CALCIUM SERPL-MCNC: 9.3 MG/DL (ref 8.6–10.4)
CHLORIDE BLD-SCNC: 98 MMOL/L (ref 98–107)
CHOLESTEROL/HDL RATIO: 3.9
CHOLESTEROL: 176 MG/DL
CO2: 30 MMOL/L (ref 20–31)
CREAT SERPL-MCNC: 1.11 MG/DL (ref 0.7–1.2)
DIFFERENTIAL TYPE: YES
EKG ATRIAL RATE: 76 BPM
EKG P AXIS: 51 DEGREES
EKG P-R INTERVAL: 212 MS
EKG Q-T INTERVAL: 410 MS
EKG QRS DURATION: 154 MS
EKG QTC CALCULATION (BAZETT): 461 MS
EKG R AXIS: 38 DEGREES
EKG T AXIS: 34 DEGREES
EKG VENTRICULAR RATE: 76 BPM
EOSINOPHILS RELATIVE PERCENT: 1 % (ref 0–5)
GFR AFRICAN AMERICAN: >60 ML/MIN
GFR NON-AFRICAN AMERICAN: >60 ML/MIN
GFR SERPL CREATININE-BSD FRML MDRD: ABNORMAL ML/MIN/{1.73_M2}
GFR SERPL CREATININE-BSD FRML MDRD: ABNORMAL ML/MIN/{1.73_M2}
GLUCOSE BLD-MCNC: 94 MG/DL (ref 70–99)
HCT VFR BLD CALC: 48.6 % (ref 41–53)
HDLC SERPL-MCNC: 45 MG/DL
HEMOGLOBIN: 16.1 G/DL (ref 13.5–17.5)
IMMATURE GRANULOCYTES: ABNORMAL %
LDL CHOLESTEROL: 105 MG/DL (ref 0–130)
LYMPHOCYTES # BLD: 19 % (ref 13–44)
MAGNESIUM: 2.3 MG/DL (ref 1.6–2.6)
MCH RBC QN AUTO: 29.1 PG (ref 26–34)
MCHC RBC AUTO-ENTMCNC: 33.1 G/DL (ref 31–37)
MCV RBC AUTO: 88 FL (ref 80–100)
MONOCYTES # BLD: 9 % (ref 5–9)
NRBC AUTOMATED: ABNORMAL PER 100 WBC
PATIENT FASTING?: NO
PDW BLD-RTO: 13.9 % (ref 12.1–15.2)
PLATELET # BLD: 225 K/UL (ref 140–450)
PLATELET ESTIMATE: ABNORMAL
PMV BLD AUTO: ABNORMAL FL (ref 6–12)
POTASSIUM SERPL-SCNC: 4.1 MMOL/L (ref 3.7–5.3)
RBC # BLD: 5.52 M/UL (ref 4.5–5.9)
RBC # BLD: ABNORMAL 10*6/UL
SEG NEUTROPHILS: 70 % (ref 39–75)
SEGMENTED NEUTROPHILS ABSOLUTE COUNT: 7.5 K/UL (ref 2.1–6.5)
SODIUM BLD-SCNC: 137 MMOL/L (ref 135–144)
TOTAL PROTEIN: 7.3 G/DL (ref 6.4–8.3)
TRIGL SERPL-MCNC: 128 MG/DL
TSH SERPL DL<=0.05 MIU/L-ACNC: 1.4 MIU/L (ref 0.3–5)
VITAMIN D 25-HYDROXY: 33.6 NG/ML (ref 30–100)
VLDLC SERPL CALC-MCNC: NORMAL MG/DL (ref 1–30)
WBC # BLD: 10.7 K/UL (ref 3.5–11)
WBC # BLD: ABNORMAL 10*3/UL

## 2018-11-12 PROCEDURE — 71046 X-RAY EXAM CHEST 2 VIEWS: CPT

## 2018-11-12 PROCEDURE — 83735 ASSAY OF MAGNESIUM: CPT

## 2018-11-12 PROCEDURE — 80053 COMPREHEN METABOLIC PANEL: CPT

## 2018-11-12 PROCEDURE — 85025 COMPLETE CBC W/AUTO DIFF WBC: CPT

## 2018-11-12 PROCEDURE — 36415 COLL VENOUS BLD VENIPUNCTURE: CPT

## 2018-11-12 PROCEDURE — 93005 ELECTROCARDIOGRAM TRACING: CPT

## 2018-11-12 PROCEDURE — 82306 VITAMIN D 25 HYDROXY: CPT

## 2018-11-12 PROCEDURE — 80061 LIPID PANEL: CPT

## 2018-11-12 PROCEDURE — 84443 ASSAY THYROID STIM HORMONE: CPT

## 2018-11-13 ENCOUNTER — OFFICE VISIT (OUTPATIENT)
Dept: CARDIOLOGY CLINIC | Age: 78
End: 2018-11-13
Payer: MEDICARE

## 2018-11-13 VITALS
BODY MASS INDEX: 25.24 KG/M2 | OXYGEN SATURATION: 95 % | SYSTOLIC BLOOD PRESSURE: 120 MMHG | HEART RATE: 78 BPM | DIASTOLIC BLOOD PRESSURE: 80 MMHG | WEIGHT: 166 LBS

## 2018-11-13 DIAGNOSIS — I63.512 ACUTE ISCHEMIC LEFT MCA STROKE (HCC): ICD-10-CM

## 2018-11-13 DIAGNOSIS — Z95.0 PACEMAKER: ICD-10-CM

## 2018-11-13 DIAGNOSIS — I10 ESSENTIAL HYPERTENSION: Primary | ICD-10-CM

## 2018-11-13 DIAGNOSIS — E55.9 VITAMIN D DEFICIENCY DISEASE: ICD-10-CM

## 2018-11-13 DIAGNOSIS — E78.49 OTHER HYPERLIPIDEMIA: ICD-10-CM

## 2018-11-13 DIAGNOSIS — I48.91 ATRIAL FIBRILLATION, UNSPECIFIED TYPE (HCC): ICD-10-CM

## 2018-11-13 PROCEDURE — 1101F PT FALLS ASSESS-DOCD LE1/YR: CPT | Performed by: INTERNAL MEDICINE

## 2018-11-13 PROCEDURE — 4040F PNEUMOC VAC/ADMIN/RCVD: CPT | Performed by: INTERNAL MEDICINE

## 2018-11-13 PROCEDURE — 1123F ACP DISCUSS/DSCN MKR DOCD: CPT | Performed by: INTERNAL MEDICINE

## 2018-11-13 PROCEDURE — 93288 INTERROG EVL PM/LDLS PM IP: CPT | Performed by: INTERNAL MEDICINE

## 2018-11-13 PROCEDURE — G8427 DOCREV CUR MEDS BY ELIG CLIN: HCPCS | Performed by: INTERNAL MEDICINE

## 2018-11-13 PROCEDURE — G8484 FLU IMMUNIZE NO ADMIN: HCPCS | Performed by: INTERNAL MEDICINE

## 2018-11-13 PROCEDURE — G8417 CALC BMI ABV UP PARAM F/U: HCPCS | Performed by: INTERNAL MEDICINE

## 2018-11-13 PROCEDURE — 1036F TOBACCO NON-USER: CPT | Performed by: INTERNAL MEDICINE

## 2018-11-13 PROCEDURE — 99214 OFFICE O/P EST MOD 30 MIN: CPT | Performed by: INTERNAL MEDICINE

## 2018-11-13 PROCEDURE — G8598 ASA/ANTIPLAT THER USED: HCPCS | Performed by: INTERNAL MEDICINE

## 2018-11-26 NOTE — PROGRESS NOTES
Ov DR Excell Gitelman 1 year follow up   Pacemaker check per medtronic  Was on prednisone and muscle   Relaxer for rt shoulder pain   Done, feeling better. No chest pain or sob  No other hospitalizations or   Procedures since seen. Bedside echo done  Has redness rt index finger  Always feels cold been this  Way for years  Will see in 1 year.
he developed dysphagia, which has almost completely resolved. 5.  Hypertension, very labile, but controlled today. 6.  Normal LV function by bedside echocardiogram.  7.  Hyperlipidemia, untreated, although it is not bad today. 8.  Ventral hernia. PLAN:  1. See in 1 year. 2.  Continue same medications. DISCUSSION:  Mr. Kayleen Hubbard overall is doing well. He has had no chest pain, shortness of breath or loss of energy. There is no indication to do any cardiac testing such as stress test.  Bedside echocardiogram showed normal LV size and function. I made no change in medications. He will continue his Coumadin and I will see him in 1 year in followup. Thank you very much for allowing me the privilege of seeing Mr. Kayleen Hubbard. If you have any questions on my thoughts, please do not hesitate to contact me.     Sincerely,        Jaret Carpenter    D: 11/13/2018 10:05:16     T: 11/14/2018 3:26:14    [id number]  Job#: [id number]   Doc#: [id number]

## 2018-12-04 ENCOUNTER — HOSPITAL ENCOUNTER (OUTPATIENT)
Dept: PHARMACY | Age: 78
Setting detail: THERAPIES SERIES
Discharge: HOME | End: 2018-12-04
Payer: MEDICARE

## 2018-12-04 VITALS
SYSTOLIC BLOOD PRESSURE: 126 MMHG | WEIGHT: 167 LBS | BODY MASS INDEX: 25.39 KG/M2 | HEART RATE: 80 BPM | DIASTOLIC BLOOD PRESSURE: 84 MMHG

## 2018-12-04 DIAGNOSIS — I63.119 CEREBRAL INFARCTION DUE TO EMBOLISM OF VERTEBRAL ARTERY, UNSPECIFIED BLOOD VESSEL LATERALITY (HCC): ICD-10-CM

## 2018-12-04 DIAGNOSIS — I63.139 CEREBRAL INFARCTION DUE TO EMBOLISM OF CAROTID ARTERY, UNSPECIFIED BLOOD VESSEL LATERALITY (HCC): ICD-10-CM

## 2018-12-04 DIAGNOSIS — I63.512 ACUTE ISCHEMIC LEFT MCA STROKE (HCC): ICD-10-CM

## 2018-12-04 DIAGNOSIS — I48.91 ATRIAL FIBRILLATION, UNSPECIFIED TYPE (HCC): ICD-10-CM

## 2018-12-04 LAB — INR BLD: 2.8

## 2018-12-04 PROCEDURE — 85610 PROTHROMBIN TIME: CPT

## 2018-12-04 PROCEDURE — 99211 OFF/OP EST MAY X REQ PHY/QHP: CPT

## 2019-01-15 ENCOUNTER — HOSPITAL ENCOUNTER (OUTPATIENT)
Dept: PHARMACY | Age: 79
Setting detail: THERAPIES SERIES
Discharge: HOME | End: 2019-01-15
Payer: MEDICARE

## 2019-01-15 VITALS
HEART RATE: 80 BPM | WEIGHT: 168 LBS | SYSTOLIC BLOOD PRESSURE: 102 MMHG | DIASTOLIC BLOOD PRESSURE: 62 MMHG | BODY MASS INDEX: 25.54 KG/M2

## 2019-01-15 DIAGNOSIS — I63.139 CEREBRAL INFARCTION DUE TO EMBOLISM OF CAROTID ARTERY, UNSPECIFIED BLOOD VESSEL LATERALITY (HCC): ICD-10-CM

## 2019-01-15 DIAGNOSIS — I63.512 ACUTE ISCHEMIC LEFT MCA STROKE (HCC): ICD-10-CM

## 2019-01-15 DIAGNOSIS — I48.91 ATRIAL FIBRILLATION, UNSPECIFIED TYPE (HCC): ICD-10-CM

## 2019-01-15 DIAGNOSIS — I63.119 CEREBRAL INFARCTION DUE TO EMBOLISM OF VERTEBRAL ARTERY, UNSPECIFIED BLOOD VESSEL LATERALITY (HCC): ICD-10-CM

## 2019-01-15 LAB — INR BLD: 2.2

## 2019-01-15 PROCEDURE — 85610 PROTHROMBIN TIME: CPT

## 2019-01-15 PROCEDURE — 99211 OFF/OP EST MAY X REQ PHY/QHP: CPT

## 2019-01-15 RX ORDER — LISINOPRIL 2.5 MG/1
TABLET ORAL
Qty: 180 TABLET | Refills: 3 | Status: SHIPPED | OUTPATIENT
Start: 2019-01-15 | End: 2019-02-02 | Stop reason: SDUPTHER

## 2019-01-15 RX ORDER — WARFARIN SODIUM 5 MG/1
TABLET ORAL
Qty: 90 TABLET | Refills: 3 | Status: SHIPPED | OUTPATIENT
Start: 2019-01-15 | End: 2019-01-15 | Stop reason: SDUPTHER

## 2019-01-15 RX ORDER — LISINOPRIL 2.5 MG/1
TABLET ORAL
Qty: 180 TABLET | Refills: 3 | Status: SHIPPED | OUTPATIENT
Start: 2019-01-15 | End: 2019-01-15 | Stop reason: SDUPTHER

## 2019-01-15 RX ORDER — WARFARIN SODIUM 5 MG/1
TABLET ORAL
Qty: 90 TABLET | Refills: 3 | Status: SHIPPED | OUTPATIENT
Start: 2019-01-15 | End: 2019-02-02 | Stop reason: SDUPTHER

## 2019-02-04 RX ORDER — LISINOPRIL 2.5 MG/1
TABLET ORAL
Qty: 180 TABLET | Refills: 3 | Status: SHIPPED | OUTPATIENT
Start: 2019-02-04 | End: 2019-11-12 | Stop reason: SDUPTHER

## 2019-02-04 RX ORDER — WARFARIN SODIUM 5 MG/1
TABLET ORAL
Qty: 90 TABLET | Refills: 3 | Status: SHIPPED | OUTPATIENT
Start: 2019-02-04 | End: 2019-11-12 | Stop reason: SDUPTHER

## 2019-02-26 ENCOUNTER — HOSPITAL ENCOUNTER (OUTPATIENT)
Dept: PHARMACY | Age: 79
Setting detail: THERAPIES SERIES
Discharge: HOME | End: 2019-02-26
Payer: MEDICARE

## 2019-02-26 VITALS
WEIGHT: 170.4 LBS | HEART RATE: 80 BPM | SYSTOLIC BLOOD PRESSURE: 132 MMHG | BODY MASS INDEX: 25.91 KG/M2 | DIASTOLIC BLOOD PRESSURE: 74 MMHG

## 2019-02-26 DIAGNOSIS — I63.512 ACUTE ISCHEMIC LEFT MCA STROKE (HCC): ICD-10-CM

## 2019-02-26 DIAGNOSIS — I48.91 ATRIAL FIBRILLATION, UNSPECIFIED TYPE (HCC): ICD-10-CM

## 2019-02-26 DIAGNOSIS — I63.119 CEREBRAL INFARCTION DUE TO EMBOLISM OF VERTEBRAL ARTERY, UNSPECIFIED BLOOD VESSEL LATERALITY (HCC): ICD-10-CM

## 2019-02-26 LAB — INR BLD: 2.4

## 2019-02-26 PROCEDURE — 85610 PROTHROMBIN TIME: CPT

## 2019-02-26 PROCEDURE — 99211 OFF/OP EST MAY X REQ PHY/QHP: CPT

## 2019-04-07 ENCOUNTER — HOSPITAL ENCOUNTER (EMERGENCY)
Age: 79
Discharge: HOME | End: 2019-04-07
Attending: INTERNAL MEDICINE
Payer: MEDICARE

## 2019-04-07 ENCOUNTER — APPOINTMENT (OUTPATIENT)
Dept: GENERAL RADIOLOGY | Age: 79
End: 2019-04-07
Payer: MEDICARE

## 2019-04-07 VITALS
TEMPERATURE: 98.8 F | DIASTOLIC BLOOD PRESSURE: 63 MMHG | WEIGHT: 170 LBS | OXYGEN SATURATION: 96 % | HEART RATE: 73 BPM | BODY MASS INDEX: 25.76 KG/M2 | RESPIRATION RATE: 18 BRPM | SYSTOLIC BLOOD PRESSURE: 126 MMHG | HEIGHT: 68 IN

## 2019-04-07 DIAGNOSIS — M17.9 OSTEOARTHRITIS OF KNEE, UNSPECIFIED LATERALITY, UNSPECIFIED OSTEOARTHRITIS TYPE: ICD-10-CM

## 2019-04-07 DIAGNOSIS — Z79.01 ANTICOAGULATED: ICD-10-CM

## 2019-04-07 DIAGNOSIS — M25.462 KNEE EFFUSION, LEFT: Primary | ICD-10-CM

## 2019-04-07 LAB
INR BLD: 2.2
PROTHROMBIN TIME: 20.9 SEC (ref 9–11.6)

## 2019-04-07 PROCEDURE — 73562 X-RAY EXAM OF KNEE 3: CPT

## 2019-04-07 PROCEDURE — 6370000000 HC RX 637 (ALT 250 FOR IP): Performed by: INTERNAL MEDICINE

## 2019-04-07 PROCEDURE — 99283 EMERGENCY DEPT VISIT LOW MDM: CPT

## 2019-04-07 PROCEDURE — 85610 PROTHROMBIN TIME: CPT

## 2019-04-07 PROCEDURE — 36415 COLL VENOUS BLD VENIPUNCTURE: CPT

## 2019-04-07 RX ORDER — ACETAMINOPHEN 325 MG/1
650 TABLET ORAL ONCE
Status: COMPLETED | OUTPATIENT
Start: 2019-04-07 | End: 2019-04-07

## 2019-04-07 RX ADMIN — ACETAMINOPHEN 650 MG: 325 TABLET, FILM COATED ORAL at 11:05

## 2019-04-07 ASSESSMENT — PAIN DESCRIPTION - PAIN TYPE: TYPE: ACUTE PAIN

## 2019-04-07 ASSESSMENT — PAIN DESCRIPTION - LOCATION: LOCATION: KNEE

## 2019-04-07 ASSESSMENT — PAIN SCALES - GENERAL
PAINLEVEL_OUTOF10: 10
PAINLEVEL_OUTOF10: 0
PAINLEVEL_OUTOF10: 5

## 2019-04-07 ASSESSMENT — PAIN DESCRIPTION - ORIENTATION: ORIENTATION: LEFT

## 2019-04-07 NOTE — ED PROVIDER NOTES
reviewed and negative.        PASTMEDICAL HISTORY     Past Medical History:   Diagnosis Date    Arthritis     Atrial fibrillation ([address]     CAD (coronary artery disease)     Hx of blood clots     right arm after iv    Hyperlipidemia     Hypertension     Kidney stones     Pacemaker 11/20/13    medtronic    Pneumonia     Right bundle branch block     Unspecified cerebral artery occlusion with cerebral infarction          SURGICAL HISTORY       Past Surgical History:   Procedure Laterality Date    CATARACT REMOVAL WITH IMPLANT Left 7-8-13    CATARACT REMOVAL WITH IMPLANT Right 08/05/13    COLONOSCOPY      EYE SURGERY      [facility]    KIDNEY STONE SURGERY      [id number]    PACEMAKER PLACEMENT Left 11/20/13   [facility] SHOULDER SURGERY  2002    [facility]         CURRENT MEDICATIONS       Discharge Medication List as of 4/7/2019 12:00 PM      CONTINUE these medications which have NOT CHANGED    Details   lisinopril (PRINIVIL;ZESTRIL) 2.5 MG tablet TAKE 1 TABLET BY MOUTH  TWICE A DAY, Disp-180 tablet, R-3Normal      warfarin (COUMADIN) 5 MG tablet TAKE 1 TABLET BY MOUTH ONCE DAILY, Disp-90 tablet, R-3Normal      metoprolol tartrate (LOPRESSOR) 25 MG tablet TAKE ONE-HALF TABLET BY  MOUTH TWICE A DAY, Disp-90 tablet, R-3Normal      vitamin D (CHOLECALCIFEROL) 1000 UNIT TABS tablet Take 1,000 Units by mouth daily Historical Med      Zinc Gluconate 15 MG TABS Take 1 tablet by mouth daily Historical Med      LUTEIN PO Take 5 mg by mouth daily      LECITHIN PO Take 6.2 g by mouth daily 3.1 grams per tablet      Coenzyme Q10 (CO Q 10 PO) Take 100 mg by mouth daily       ascorbic acid (VITAMIN C) 500 MG tablet Take 1,000 mg by mouth daily Historical Med      Omega-3 Fatty Acids (OMEGA-3 FISH OIL) 1200 MG CAPS Take 2,400 mg by mouth daily With  mg,  mg Historical Med      Magnesium 100 MG TABS Take 100 mg by mouth daily Historical Med      saw palmetto 160 MG capsule Take 160 mg by mouth daily      Vitamin Mixture (VITAMIN E-SELENIUM) 400-50 UNIT-MCG CAPS Take 2 tablets by mouth daily With grape seed extract 38 mg      B Complex Vitamins (B COMPLEX 1 PO) Take 3 tablets by mouth daily shaklee supplement B-complex (high potency B vitamins with folic acid)      NONFORMULARY Take 1 tablet by mouth 2 times daily as needed Historical Med             ALLERGIES     Amlodipine; Ciprofloxacin; Naprosyn [naproxen];  Other; and Red dye    FAMILY HISTORY       Family History   Problem Relation Age of Onset   Donny Nunes Cancer Mother 76        lung ca    Cancer Father 70        bowel ca    Cancer Sister     Cancer Brother           SOCIAL HISTORY       Social History     Socioeconomic History    Marital status:      Spouse name: Maida Barrientos Number of children: 3    Years of education: 25    Highest education level: None   Occupational History    None   Social Needs    Financial resource strain: None    Food insecurity:     Worry: None     Inability: None    Transportation needs:     Medical: None     Non-medical: None   Tobacco Use    Smoking status: Former Smoker     Last attempt to quit: 1982     Years since quittin.4    Smokeless tobacco: Never Used   Substance and Sexual Activity    Alcohol use: No    Drug use: No    Sexual activity: None   Lifestyle    Physical activity:     Days per week: None     Minutes per session: None    Stress: None   Relationships    Social connections:     Talks on phone: None     Gets together: None     Attends Mosque service: None     Active member of club or organization: None     Attends meetings of clubs or organizations: None     Relationship status: None    Intimate partner violence:     Fear of current or ex partner: None     Emotionally abused: None     Physically abused: None     Forced sexual activity: None   Other Topics Concern    None   Social History Narrative    None       SCREENINGS    Tavo Coma Scale  Eye Opening: Spontaneous  Best Judgment and thought content normal.     DIAGNOSTIC RESULTS     EKG: All EKG's are interpreted by the Emergency Department Physician who either signs or Co-signs this chart in the absence of a cardiologist.    Not indicated. RADIOLOGY:   Non-plain film images such as CT, Ultrasoundand MRI are read by the radiologist.     Interpretation per the Radiologist below, if available at the time of this note:    XR KNEE LEFT (3 VIEWS)   Final Result      No acute bony abnormality. Small to moderate joint effusion with a probable intra-articular loose body. Recommend outpatient MRI left knee without contrast.      Severe osteoarthritis patellofemoral joint. ED BEDSIDE ULTRASOUND:   Performed by ED Physician - none    LABS:  Labs Reviewed   PROTIME-INR - Abnormal; Notable for the following components:       Result Value    Protime 20.9 (*)     All other components within normal limits       All other labs were within normal range or not returned as of this dictation. EMERGENCY DEPARTMENT COURSE and DIFFERENTIAL DIAGNOSIS/MDM:   Vitals:    Vitals:    04/07/19 1030   BP: 126/63   Pulse: 73   Resp: 18   Temp: 98.8 °F (37.1 °C)   TempSrc: Oral   SpO2: 96%   Weight: 170 lb (77.1 kg)   Height: 5' 8\" (1.727 m)       Noted    MDM    CRITICAL CARE TIME   Total Critical Care time was 0 minutes. EDCOURSE       CONSULTS:  None    PROCEDURES:  Unlessotherwise noted below, none     Procedures      Summation    Patient with left knee and effusion and loose joints body secondary to severe osteoarthritis. There is no evidence of septic joint at this time. He is anticoagulated for atrial fibrillation which is in the therapeutic range. He is at risk for hemarthrosis but with a lack of mechanism injury it is less likely. He was advised of this possibility though low likelihood. He is well, well hydrated, nontoxic, hemodynamically stable and satisfactory for discharge for outpatient management.      I medication side effects, risk of tolerance/dependence & alternative treatments discussed., No signs of potential drug abuse or diversion identified: otherwise, see note documentation Corby Manley MD)    (Please note that portions of this note were completed with a voice recognition program.  Efforts were made to edit the dictations but occasionally words are mis-transcribed.)    Corby Manley MD (electronically signed)  Attending Emergency Physician            Corby Manley MD  04/07/19 81 MD Mani  04/07/19 9040

## 2019-04-08 ENCOUNTER — TELEPHONE (OUTPATIENT)
Dept: PHARMACY | Age: 79
End: 2019-04-08

## 2019-05-14 ENCOUNTER — NURSE ONLY (OUTPATIENT)
Dept: CARDIOLOGY CLINIC | Age: 79
End: 2019-05-14
Payer: MEDICARE

## 2019-05-14 DIAGNOSIS — Z95.0 PACEMAKER: ICD-10-CM

## 2019-05-14 PROCEDURE — 93291 INTERROG DEV EVAL SCRMS IP: CPT | Performed by: INTERNAL MEDICINE

## 2019-05-21 ENCOUNTER — HOSPITAL ENCOUNTER (OUTPATIENT)
Dept: PHARMACY | Age: 79
Setting detail: THERAPIES SERIES
Discharge: HOME | End: 2019-05-21
Payer: MEDICARE

## 2019-05-21 VITALS
BODY MASS INDEX: 25.51 KG/M2 | WEIGHT: 167.8 LBS | SYSTOLIC BLOOD PRESSURE: 138 MMHG | DIASTOLIC BLOOD PRESSURE: 76 MMHG | HEART RATE: 72 BPM

## 2019-05-21 DIAGNOSIS — I63.512 ACUTE ISCHEMIC LEFT MCA STROKE (HCC): ICD-10-CM

## 2019-05-21 LAB — INR BLD: 2.4

## 2019-05-21 PROCEDURE — 85610 PROTHROMBIN TIME: CPT

## 2019-05-21 PROCEDURE — 99211 OFF/OP EST MAY X REQ PHY/QHP: CPT

## 2019-05-21 NOTE — PROGRESS NOTES
Fingerstick INR drawn per clinic protocol. Patient states no visible blood in urine and no black tarry stool. Denies any missed doses of warfarin. No change in other maintenance medications. Arno Gowers continues to eat greens consistently including a big salad every other day. Since Diego's INR remains therapeutic, we will continue current weekly warfarin regimen and recheck INR in 6 week(s). Patient acknowledges working in consult agreement with pharmacist as referred by his/her physician.

## 2019-07-02 ENCOUNTER — HOSPITAL ENCOUNTER (OUTPATIENT)
Dept: PHARMACY | Age: 79
Setting detail: THERAPIES SERIES
Discharge: HOME | End: 2019-07-02
Payer: MEDICARE

## 2019-07-02 VITALS
DIASTOLIC BLOOD PRESSURE: 73 MMHG | WEIGHT: 168.4 LBS | SYSTOLIC BLOOD PRESSURE: 119 MMHG | HEART RATE: 75 BPM | BODY MASS INDEX: 25.61 KG/M2

## 2019-07-02 DIAGNOSIS — I63.512 ACUTE ISCHEMIC LEFT MCA STROKE (HCC): ICD-10-CM

## 2019-07-02 LAB — INR BLD: 2.7

## 2019-07-02 PROCEDURE — 99211 OFF/OP EST MAY X REQ PHY/QHP: CPT

## 2019-07-02 PROCEDURE — 85610 PROTHROMBIN TIME: CPT

## 2019-07-02 NOTE — PROGRESS NOTES
Fingerstick INR drawn per clinic protocol. Patient states no visible blood in urine and no black tarry stool. Denies any missed doses of warfarin. No change in other maintenance medications or in diet. Will continue current weekly warfarin regimen and recheck INR in 6 week(s). Patient acknowledges working in consult agreement with pharmacist as referred by his/her physician.

## 2019-08-13 ENCOUNTER — HOSPITAL ENCOUNTER (OUTPATIENT)
Dept: PHARMACY | Age: 79
Setting detail: THERAPIES SERIES
Discharge: HOME | End: 2019-08-13
Payer: MEDICARE

## 2019-08-13 VITALS
SYSTOLIC BLOOD PRESSURE: 125 MMHG | HEART RATE: 69 BPM | BODY MASS INDEX: 25.39 KG/M2 | WEIGHT: 167 LBS | DIASTOLIC BLOOD PRESSURE: 73 MMHG

## 2019-08-13 DIAGNOSIS — I63.512 ACUTE ISCHEMIC LEFT MCA STROKE (HCC): ICD-10-CM

## 2019-08-13 LAB — INR BLD: 2.6

## 2019-08-13 PROCEDURE — 99211 OFF/OP EST MAY X REQ PHY/QHP: CPT

## 2019-08-13 PROCEDURE — 85610 PROTHROMBIN TIME: CPT

## 2019-09-24 ENCOUNTER — HOSPITAL ENCOUNTER (OUTPATIENT)
Dept: PHARMACY | Age: 79
Setting detail: THERAPIES SERIES
Discharge: HOME | End: 2019-09-24
Payer: MEDICARE

## 2019-09-24 VITALS
WEIGHT: 166.2 LBS | HEART RATE: 71 BPM | BODY MASS INDEX: 25.27 KG/M2 | DIASTOLIC BLOOD PRESSURE: 69 MMHG | SYSTOLIC BLOOD PRESSURE: 126 MMHG

## 2019-09-24 DIAGNOSIS — I63.512 ACUTE ISCHEMIC LEFT MCA STROKE (HCC): ICD-10-CM

## 2019-09-24 LAB — INR BLD: 2.6

## 2019-09-24 PROCEDURE — 99211 OFF/OP EST MAY X REQ PHY/QHP: CPT

## 2019-09-24 PROCEDURE — 85610 PROTHROMBIN TIME: CPT

## 2019-11-11 ENCOUNTER — HOSPITAL ENCOUNTER (OUTPATIENT)
Dept: GENERAL RADIOLOGY | Age: 79
Discharge: HOME | End: 2019-11-13
Payer: MEDICARE

## 2019-11-11 ENCOUNTER — HOSPITAL ENCOUNTER (OUTPATIENT)
Age: 79
Discharge: HOME | End: 2019-11-13
Payer: MEDICARE

## 2019-11-11 ENCOUNTER — HOSPITAL ENCOUNTER (OUTPATIENT)
Age: 79
Discharge: HOME | End: 2019-11-11
Payer: MEDICARE

## 2019-11-11 DIAGNOSIS — Z95.0 PACEMAKER: ICD-10-CM

## 2019-11-11 DIAGNOSIS — I48.91 ATRIAL FIBRILLATION, UNSPECIFIED TYPE (HCC): ICD-10-CM

## 2019-11-11 DIAGNOSIS — I63.512 ACUTE ISCHEMIC LEFT MCA STROKE (HCC): ICD-10-CM

## 2019-11-11 DIAGNOSIS — E55.9 VITAMIN D DEFICIENCY DISEASE: ICD-10-CM

## 2019-11-11 DIAGNOSIS — E78.49 OTHER HYPERLIPIDEMIA: ICD-10-CM

## 2019-11-11 DIAGNOSIS — I10 ESSENTIAL HYPERTENSION: ICD-10-CM

## 2019-11-11 PROCEDURE — 93005 ELECTROCARDIOGRAM TRACING: CPT

## 2019-11-11 PROCEDURE — 71046 X-RAY EXAM CHEST 2 VIEWS: CPT

## 2019-11-12 ENCOUNTER — OFFICE VISIT (OUTPATIENT)
Dept: CARDIOLOGY CLINIC | Age: 79
End: 2019-11-12
Payer: MEDICARE

## 2019-11-12 ENCOUNTER — HOSPITAL ENCOUNTER (OUTPATIENT)
Dept: PHARMACY | Age: 79
Setting detail: THERAPIES SERIES
Discharge: HOME | End: 2019-11-12
Payer: MEDICARE

## 2019-11-12 ENCOUNTER — HOSPITAL ENCOUNTER (OUTPATIENT)
Age: 79
Discharge: HOME | End: 2019-11-12
Payer: MEDICARE

## 2019-11-12 VITALS
SYSTOLIC BLOOD PRESSURE: 140 MMHG | WEIGHT: 165 LBS | BODY MASS INDEX: 25.09 KG/M2 | HEART RATE: 80 BPM | DIASTOLIC BLOOD PRESSURE: 80 MMHG | OXYGEN SATURATION: 96 %

## 2019-11-12 VITALS
WEIGHT: 164.4 LBS | BODY MASS INDEX: 25 KG/M2 | DIASTOLIC BLOOD PRESSURE: 72 MMHG | HEART RATE: 71 BPM | SYSTOLIC BLOOD PRESSURE: 118 MMHG

## 2019-11-12 DIAGNOSIS — E55.9 VITAMIN D DEFICIENCY DISEASE: ICD-10-CM

## 2019-11-12 DIAGNOSIS — I48.91 ATRIAL FIBRILLATION, UNSPECIFIED TYPE (HCC): ICD-10-CM

## 2019-11-12 DIAGNOSIS — E78.49 OTHER HYPERLIPIDEMIA: ICD-10-CM

## 2019-11-12 DIAGNOSIS — Z95.0 PACEMAKER: ICD-10-CM

## 2019-11-12 DIAGNOSIS — I10 ESSENTIAL HYPERTENSION: ICD-10-CM

## 2019-11-12 DIAGNOSIS — I63.512 ACUTE ISCHEMIC LEFT MCA STROKE (HCC): ICD-10-CM

## 2019-11-12 DIAGNOSIS — Z95.0 PACEMAKER: Primary | ICD-10-CM

## 2019-11-12 LAB
ABSOLUTE EOS #: 0.1 K/UL (ref 0–0.4)
ABSOLUTE IMMATURE GRANULOCYTE: ABNORMAL K/UL (ref 0–0.3)
ABSOLUTE LYMPH #: 1.5 K/UL (ref 1–4.8)
ABSOLUTE MONO #: 0.7 K/UL (ref 0–1)
ALBUMIN SERPL-MCNC: 4.4 G/DL (ref 3.5–5.2)
ALBUMIN/GLOBULIN RATIO: ABNORMAL (ref 1–2.5)
ALP BLD-CCNC: 58 U/L (ref 40–129)
ALT SERPL-CCNC: 17 U/L (ref 5–41)
ANION GAP SERPL CALCULATED.3IONS-SCNC: 11 MMOL/L (ref 9–17)
AST SERPL-CCNC: 19 U/L
BASOPHILS # BLD: 0 % (ref 0–2)
BASOPHILS ABSOLUTE: 0 K/UL (ref 0–0.2)
BILIRUB SERPL-MCNC: 0.64 MG/DL (ref 0.3–1.2)
BUN BLDV-MCNC: 23 MG/DL (ref 8–23)
BUN/CREAT BLD: 20 (ref 9–20)
CALCIUM SERPL-MCNC: 10.7 MG/DL (ref 8.6–10.4)
CHLORIDE BLD-SCNC: 100 MMOL/L (ref 98–107)
CHOLESTEROL/HDL RATIO: 4.1
CHOLESTEROL: 196 MG/DL
CO2: 29 MMOL/L (ref 20–31)
CREAT SERPL-MCNC: 1.15 MG/DL (ref 0.7–1.2)
DIFFERENTIAL TYPE: YES
EKG ATRIAL RATE: 70 BPM
EKG P AXIS: 86 DEGREES
EKG P-R INTERVAL: 220 MS
EKG Q-T INTERVAL: 420 MS
EKG QRS DURATION: 154 MS
EKG QTC CALCULATION (BAZETT): 453 MS
EKG R AXIS: 21 DEGREES
EKG T AXIS: 26 DEGREES
EKG VENTRICULAR RATE: 70 BPM
EOSINOPHILS RELATIVE PERCENT: 1 % (ref 0–5)
FOLATE: >20 NG/ML
GFR AFRICAN AMERICAN: >60 ML/MIN
GFR NON-AFRICAN AMERICAN: >60 ML/MIN
GFR SERPL CREATININE-BSD FRML MDRD: ABNORMAL ML/MIN/{1.73_M2}
GFR SERPL CREATININE-BSD FRML MDRD: ABNORMAL ML/MIN/{1.73_M2}
GLUCOSE BLD-MCNC: 105 MG/DL (ref 70–99)
HCT VFR BLD CALC: 48.3 % (ref 41–53)
HDLC SERPL-MCNC: 48 MG/DL
HEMOGLOBIN: 16.1 G/DL (ref 13.5–17.5)
IMMATURE GRANULOCYTES: ABNORMAL %
INR BLD: 2.4
LDL CHOLESTEROL: 135 MG/DL (ref 0–130)
LYMPHOCYTES # BLD: 16 % (ref 13–44)
MAGNESIUM: 2.7 MG/DL (ref 1.6–2.6)
MCH RBC QN AUTO: 29.5 PG (ref 26–34)
MCHC RBC AUTO-ENTMCNC: 33.3 G/DL (ref 31–37)
MCV RBC AUTO: 88.6 FL (ref 80–100)
MONOCYTES # BLD: 8 % (ref 5–9)
NRBC AUTOMATED: ABNORMAL PER 100 WBC
PATIENT FASTING?: YES
PDW BLD-RTO: 14.2 % (ref 12.1–15.2)
PLATELET # BLD: 194 K/UL (ref 140–450)
PLATELET ESTIMATE: ABNORMAL
PMV BLD AUTO: ABNORMAL FL (ref 6–12)
POTASSIUM SERPL-SCNC: 4.8 MMOL/L (ref 3.7–5.3)
RBC # BLD: 5.45 M/UL (ref 4.5–5.9)
RBC # BLD: ABNORMAL 10*6/UL
SEG NEUTROPHILS: 75 % (ref 39–75)
SEGMENTED NEUTROPHILS ABSOLUTE COUNT: 6.7 K/UL (ref 2.1–6.5)
SODIUM BLD-SCNC: 140 MMOL/L (ref 135–144)
TOTAL PROTEIN: 8 G/DL (ref 6.4–8.3)
TRIGL SERPL-MCNC: 67 MG/DL
TSH SERPL DL<=0.05 MIU/L-ACNC: 1.5 MIU/L (ref 0.3–5)
VITAMIN B-12: 1303 PG/ML (ref 232–1245)
VITAMIN D 25-HYDROXY: 50.7 NG/ML (ref 30–100)
VLDLC SERPL CALC-MCNC: ABNORMAL MG/DL (ref 1–30)
WBC # BLD: 9.1 K/UL (ref 3.5–11)
WBC # BLD: ABNORMAL 10*3/UL

## 2019-11-12 PROCEDURE — 82306 VITAMIN D 25 HYDROXY: CPT

## 2019-11-12 PROCEDURE — 36415 COLL VENOUS BLD VENIPUNCTURE: CPT

## 2019-11-12 PROCEDURE — 1036F TOBACCO NON-USER: CPT | Performed by: INTERNAL MEDICINE

## 2019-11-12 PROCEDURE — G8598 ASA/ANTIPLAT THER USED: HCPCS | Performed by: INTERNAL MEDICINE

## 2019-11-12 PROCEDURE — 4040F PNEUMOC VAC/ADMIN/RCVD: CPT | Performed by: INTERNAL MEDICINE

## 2019-11-12 PROCEDURE — 82746 ASSAY OF FOLIC ACID SERUM: CPT

## 2019-11-12 PROCEDURE — 99211 OFF/OP EST MAY X REQ PHY/QHP: CPT

## 2019-11-12 PROCEDURE — 82607 VITAMIN B-12: CPT

## 2019-11-12 PROCEDURE — G8427 DOCREV CUR MEDS BY ELIG CLIN: HCPCS | Performed by: INTERNAL MEDICINE

## 2019-11-12 PROCEDURE — 1123F ACP DISCUSS/DSCN MKR DOCD: CPT | Performed by: INTERNAL MEDICINE

## 2019-11-12 PROCEDURE — 84443 ASSAY THYROID STIM HORMONE: CPT

## 2019-11-12 PROCEDURE — 85610 PROTHROMBIN TIME: CPT

## 2019-11-12 PROCEDURE — 83735 ASSAY OF MAGNESIUM: CPT

## 2019-11-12 PROCEDURE — 80061 LIPID PANEL: CPT

## 2019-11-12 PROCEDURE — 99214 OFFICE O/P EST MOD 30 MIN: CPT | Performed by: INTERNAL MEDICINE

## 2019-11-12 PROCEDURE — 85025 COMPLETE CBC W/AUTO DIFF WBC: CPT

## 2019-11-12 PROCEDURE — 80053 COMPREHEN METABOLIC PANEL: CPT

## 2019-11-12 PROCEDURE — G8417 CALC BMI ABV UP PARAM F/U: HCPCS | Performed by: INTERNAL MEDICINE

## 2019-11-12 PROCEDURE — 93010 ELECTROCARDIOGRAM REPORT: CPT | Performed by: INTERNAL MEDICINE

## 2019-11-12 PROCEDURE — G8484 FLU IMMUNIZE NO ADMIN: HCPCS | Performed by: INTERNAL MEDICINE

## 2019-11-12 RX ORDER — LISINOPRIL 2.5 MG/1
TABLET ORAL
Qty: 180 TABLET | Refills: 3 | Status: SHIPPED | OUTPATIENT
Start: 2019-11-12 | End: 2020-01-13 | Stop reason: SDUPTHER

## 2019-11-12 RX ORDER — WARFARIN SODIUM 5 MG/1
TABLET ORAL
Qty: 90 TABLET | Refills: 3 | Status: SHIPPED | OUTPATIENT
Start: 2019-11-12 | End: 2020-02-17 | Stop reason: SDUPTHER

## 2020-01-07 ENCOUNTER — HOSPITAL ENCOUNTER (OUTPATIENT)
Dept: PHARMACY | Age: 80
Setting detail: THERAPIES SERIES
Discharge: HOME | End: 2020-01-07
Payer: MEDICARE

## 2020-01-07 VITALS
SYSTOLIC BLOOD PRESSURE: 121 MMHG | DIASTOLIC BLOOD PRESSURE: 82 MMHG | HEART RATE: 71 BPM | BODY MASS INDEX: 25.7 KG/M2 | WEIGHT: 169 LBS

## 2020-01-07 LAB — INR BLD: 3.7

## 2020-01-07 PROCEDURE — 85610 PROTHROMBIN TIME: CPT

## 2020-01-07 PROCEDURE — 99211 OFF/OP EST MAY X REQ PHY/QHP: CPT

## 2020-01-07 NOTE — PROGRESS NOTES
Fingerstick INR drawn per clinic protocol. Patient states no visible blood in urine and no black tarry stool. Denies any missed doses of warfarin. No change in other maintenance medications. Diego's wife, Cinthia Means, says she had a fall over the holidays and has not been able to prepare their meals the past couple weeks. Prior to this, Primo Ortega had been eating \"a lot more\" vegetables, including salads and other \"greens\". Cinthia Means says she is able to get around [place]" and plans to start incorporating more salads and other vegetables back into their dietary routine as they had been previously. For now, we will have him take only 2.5 mg warfarin tonight and tomorrow night, but then resume current weekly warfarin regimen thereafter. We will recheck INR in 6 weeks per patient request. Patient acknowledges working in consult agreement with pharmacist as referred by his/her physician.

## 2020-01-13 RX ORDER — LISINOPRIL 2.5 MG/1
TABLET ORAL
Qty: 180 TABLET | Refills: 3 | Status: SHIPPED | OUTPATIENT
Start: 2020-01-13 | End: 2020-06-16 | Stop reason: SINTOL

## 2020-02-17 RX ORDER — WARFARIN SODIUM 5 MG/1
TABLET ORAL
Qty: 90 TABLET | Refills: 3 | Status: SHIPPED | OUTPATIENT
Start: 2020-02-17 | End: 2020-09-16 | Stop reason: DRUGHIGH

## 2020-02-18 ENCOUNTER — HOSPITAL ENCOUNTER (OUTPATIENT)
Dept: PHARMACY | Age: 80
Setting detail: THERAPIES SERIES
Discharge: HOME | End: 2020-02-18
Payer: MEDICARE

## 2020-02-18 VITALS
HEART RATE: 71 BPM | BODY MASS INDEX: 25.7 KG/M2 | DIASTOLIC BLOOD PRESSURE: 82 MMHG | WEIGHT: 169 LBS | SYSTOLIC BLOOD PRESSURE: 123 MMHG

## 2020-02-18 LAB — INR BLD: 3

## 2020-02-18 PROCEDURE — 85610 PROTHROMBIN TIME: CPT

## 2020-02-18 PROCEDURE — 99211 OFF/OP EST MAY X REQ PHY/QHP: CPT

## 2020-03-26 ENCOUNTER — TELEPHONE (OUTPATIENT)
Dept: PHARMACY | Age: 80
End: 2020-03-26

## 2020-05-19 ENCOUNTER — TELEPHONE (OUTPATIENT)
Dept: PHARMACY | Age: 80
End: 2020-05-19

## 2020-05-20 ENCOUNTER — HOSPITAL ENCOUNTER (OUTPATIENT)
Dept: PHARMACY | Age: 80
Setting detail: THERAPIES SERIES
Discharge: HOME | End: 2020-05-20
Payer: MEDICARE

## 2020-05-20 VITALS — TEMPERATURE: 97.4 F

## 2020-05-20 LAB — INR BLD: 2.9

## 2020-05-20 PROCEDURE — 85610 PROTHROMBIN TIME: CPT

## 2020-05-20 PROCEDURE — 99211 OFF/OP EST MAY X REQ PHY/QHP: CPT

## 2020-05-28 ENCOUNTER — HOSPITAL ENCOUNTER (OUTPATIENT)
Age: 80
Discharge: HOME | End: 2020-05-28
Payer: MEDICARE

## 2020-05-28 ENCOUNTER — OFFICE VISIT (OUTPATIENT)
Dept: CARDIOLOGY CLINIC | Age: 80
End: 2020-05-28
Payer: MEDICARE

## 2020-05-28 VITALS
BODY MASS INDEX: 26.3 KG/M2 | DIASTOLIC BLOOD PRESSURE: 90 MMHG | HEART RATE: 72 BPM | SYSTOLIC BLOOD PRESSURE: 160 MMHG | OXYGEN SATURATION: 94 % | WEIGHT: 173 LBS

## 2020-05-28 LAB
ABSOLUTE EOS #: 0.2 K/UL (ref 0–0.4)
ABSOLUTE IMMATURE GRANULOCYTE: ABNORMAL K/UL (ref 0–0.3)
ABSOLUTE LYMPH #: 1.6 K/UL (ref 1–4.8)
ABSOLUTE MONO #: 0.9 K/UL (ref 0–1)
ALBUMIN SERPL-MCNC: 4.4 G/DL (ref 3.5–5.2)
ALBUMIN/GLOBULIN RATIO: ABNORMAL (ref 1–2.5)
ALP BLD-CCNC: 50 U/L (ref 40–129)
ALT SERPL-CCNC: 16 U/L (ref 5–41)
ANION GAP SERPL CALCULATED.3IONS-SCNC: 10 MMOL/L (ref 9–17)
AST SERPL-CCNC: 24 U/L
BASOPHILS # BLD: 1 % (ref 0–2)
BASOPHILS ABSOLUTE: 0 K/UL (ref 0–0.2)
BILIRUB SERPL-MCNC: 0.43 MG/DL (ref 0.3–1.2)
BUN BLDV-MCNC: 20 MG/DL (ref 8–23)
BUN/CREAT BLD: 18 (ref 9–20)
CALCIUM SERPL-MCNC: 10.1 MG/DL (ref 8.6–10.4)
CHLORIDE BLD-SCNC: 102 MMOL/L (ref 98–107)
CO2: 26 MMOL/L (ref 20–31)
CREAT SERPL-MCNC: 1.11 MG/DL (ref 0.7–1.2)
DIFFERENTIAL TYPE: YES
EOSINOPHILS RELATIVE PERCENT: 3 % (ref 0–5)
GFR AFRICAN AMERICAN: >60 ML/MIN
GFR NON-AFRICAN AMERICAN: >60 ML/MIN
GFR SERPL CREATININE-BSD FRML MDRD: ABNORMAL ML/MIN/{1.73_M2}
GFR SERPL CREATININE-BSD FRML MDRD: ABNORMAL ML/MIN/{1.73_M2}
GLUCOSE BLD-MCNC: 123 MG/DL (ref 70–99)
HCT VFR BLD CALC: 44.3 % (ref 41–53)
HEMOGLOBIN: 15 G/DL (ref 13.5–17.5)
IMMATURE GRANULOCYTES: ABNORMAL %
LYMPHOCYTES # BLD: 20 % (ref 13–44)
MAGNESIUM: 2.3 MG/DL (ref 1.6–2.6)
MCH RBC QN AUTO: 29.5 PG (ref 26–34)
MCHC RBC AUTO-ENTMCNC: 33.8 G/DL (ref 31–37)
MCV RBC AUTO: 87.3 FL (ref 80–100)
MONOCYTES # BLD: 11 % (ref 5–9)
NRBC AUTOMATED: ABNORMAL PER 100 WBC
PDW BLD-RTO: 13.9 % (ref 12.1–15.2)
PLATELET # BLD: 173 K/UL (ref 140–450)
PLATELET ESTIMATE: ABNORMAL
PMV BLD AUTO: ABNORMAL FL (ref 6–12)
POTASSIUM SERPL-SCNC: 4.2 MMOL/L (ref 3.7–5.3)
RBC # BLD: 5.08 M/UL (ref 4.5–5.9)
RBC # BLD: ABNORMAL 10*6/UL
SEG NEUTROPHILS: 65 % (ref 39–75)
SEGMENTED NEUTROPHILS ABSOLUTE COUNT: 5.4 K/UL (ref 2.1–6.5)
SODIUM BLD-SCNC: 138 MMOL/L (ref 135–144)
TOTAL PROTEIN: 7.7 G/DL (ref 6.4–8.3)
TSH SERPL DL<=0.05 MIU/L-ACNC: 0.92 MIU/L (ref 0.3–5)
VITAMIN D 25-HYDROXY: 36 NG/ML (ref 30–100)
WBC # BLD: 8.2 K/UL (ref 3.5–11)
WBC # BLD: ABNORMAL 10*3/UL

## 2020-05-28 PROCEDURE — 84443 ASSAY THYROID STIM HORMONE: CPT

## 2020-05-28 PROCEDURE — G8417 CALC BMI ABV UP PARAM F/U: HCPCS | Performed by: INTERNAL MEDICINE

## 2020-05-28 PROCEDURE — 93005 ELECTROCARDIOGRAM TRACING: CPT

## 2020-05-28 PROCEDURE — 1123F ACP DISCUSS/DSCN MKR DOCD: CPT | Performed by: INTERNAL MEDICINE

## 2020-05-28 PROCEDURE — G8427 DOCREV CUR MEDS BY ELIG CLIN: HCPCS | Performed by: INTERNAL MEDICINE

## 2020-05-28 PROCEDURE — 1036F TOBACCO NON-USER: CPT | Performed by: INTERNAL MEDICINE

## 2020-05-28 PROCEDURE — 83735 ASSAY OF MAGNESIUM: CPT

## 2020-05-28 PROCEDURE — 82306 VITAMIN D 25 HYDROXY: CPT

## 2020-05-28 PROCEDURE — 36415 COLL VENOUS BLD VENIPUNCTURE: CPT

## 2020-05-28 PROCEDURE — 80053 COMPREHEN METABOLIC PANEL: CPT

## 2020-05-28 PROCEDURE — 4040F PNEUMOC VAC/ADMIN/RCVD: CPT | Performed by: INTERNAL MEDICINE

## 2020-05-28 PROCEDURE — 99214 OFFICE O/P EST MOD 30 MIN: CPT | Performed by: INTERNAL MEDICINE

## 2020-05-28 PROCEDURE — 85025 COMPLETE CBC W/AUTO DIFF WBC: CPT

## 2020-05-28 RX ORDER — CYPROHEPTADINE HYDROCHLORIDE 4 MG/1
4 TABLET ORAL 2 TIMES DAILY
Qty: 60 TABLET | Refills: 6 | Status: SHIPPED | OUTPATIENT
Start: 2020-05-28 | End: 2020-10-27

## 2020-05-29 LAB
EKG ATRIAL RATE: 75 BPM
EKG P AXIS: 86 DEGREES
EKG P-R INTERVAL: 228 MS
EKG Q-T INTERVAL: 420 MS
EKG QRS DURATION: 158 MS
EKG QTC CALCULATION (BAZETT): 469 MS
EKG R AXIS: 23 DEGREES
EKG T AXIS: 17 DEGREES
EKG VENTRICULAR RATE: 75 BPM

## 2020-05-29 PROCEDURE — 93010 ELECTROCARDIOGRAM REPORT: CPT | Performed by: INTERNAL MEDICINE

## 2020-06-01 NOTE — PROGRESS NOTES
Negative. MEDICATIONS AT HOME:  He is currently on vitamin C 500 mg daily, vitamin B complex daily, CoQ10 daily, Prinivil 2.5 mg b.i.d., magnesium 100 mg daily, Lopressor 25 mg half a tablet b.i.d., vitamin D 2000 units daily, vitamin E and selenium 2 tablets daily, Coumadin as directed, zinc 15 mg daily. PAST DIAGONAL HISTORY:  1. Right shoulder surgery in . 2.  Hernia repair in . 3.  Multiple kidney stones with multiple lithotripsies. 4.  Very labile hypertension. 5.  Paroxysmal atrial fibrillation, on Coumadin. 6.  Pacemaker dependent. 7.  Three eye surgeries. 8.  CVA in 2015 from which he has made a good recovery. 9.  Ventral hernia. FAMILY HISTORY:  Mother  of mouth cancer. Mother, brother and sister had cancer. SOCIAL HISTORY:  He is [de-identified]years old, , 2 children. Does not smoke or drink alcohol. Retired . Remains active. REVIEW OF SYSTEMS:  Cardiac as above. Other systems reviewed including constitutional, eyes, ears, nose and throat, cardiovascular, respiratory, GI, , musculoskeletal, integumentary, neurologic, endocrine, hematologic and allergic/immunologic are negative except for what is described above. No weight loss or weight gain. No change in bowel habits. No blood in stool. No fevers, sweats or chills. PHYSICAL EXAMINATION:  VITAL SIGNS:  His blood pressure was 160/90 with a heart rate of 70 and regular. Respiratory rate 18. O2 sat 94%. Weight 173 pounds. GENERAL:  He is a pleasant [de-identified]year-old gentleman who has slight difficulty with word finding and speech. He was oriented to person, place and time. Answered questions appropriately. SKIN:  He did have an erythematous rash on both feet and shin area. He also had erythematous patches on his arm measuring approximately 1 cm. His back also had multiple areas of, what appeared to be, erythematous rash. HEENT:  The pupils are equally round and intact.   Mucous membranes were

## 2020-06-04 ENCOUNTER — HOSPITAL ENCOUNTER (OUTPATIENT)
Dept: NON INVASIVE DIAGNOSTICS | Age: 80
Discharge: HOME | End: 2020-06-04
Payer: MEDICARE

## 2020-06-04 LAB
LV EF: 53 %
LVEF MODALITY: NORMAL

## 2020-06-04 PROCEDURE — 93306 TTE W/DOPPLER COMPLETE: CPT

## 2020-06-16 ENCOUNTER — NURSE ONLY (OUTPATIENT)
Dept: CARDIOLOGY CLINIC | Age: 80
End: 2020-06-16
Payer: MEDICARE

## 2020-06-16 PROCEDURE — 93288 INTERROG EVL PM/LDLS PM IP: CPT | Performed by: INTERNAL MEDICINE

## 2020-06-30 ENCOUNTER — TELEPHONE (OUTPATIENT)
Dept: PHARMACY | Age: 80
End: 2020-06-30

## 2020-06-30 NOTE — TELEPHONE ENCOUNTER
Spoke with Marilyn Vazquez via telephone and she states Dr. Kenneth Hobbs took Mac Altman off of lisinopril bc of \"bad rash. \" She states since stopping it has went away. COVID-19 phone screening     Call placed to screen patient prior to upcoming Medication Management visit for Anticoagulation. Does patient have fever and/or lower respiratory symptoms (SOB, difficulty breathing, cough)? [] Yes    [] No    If yes, complete Travel Screening and ask the following:   [] [Fever OR s/sxs of lower respiratory illness]  AND  [close contact with lab-confirmed COVID-19 patient within 14 days of symptom onset   [] [Fever AND s/sxs of lower respiratory illness requiring hospitalization] AND [history of travel to [place], [place], [place], [facility], [place] within 14 days of sx onset]  [] [Fever with severe acute lower respiratory illness (I.e. PNA, ARDS) requiring hospitalization and without alternative explanatory diagnosis (I.e. Influenza)] AND [no source of exposure identified]    [] None of the following; patient confirmed for regularly scheduled INR check and instructed to call the clinic immediately if any symptoms develop prior to upcoming appointment.

## 2020-07-01 ENCOUNTER — HOSPITAL ENCOUNTER (OUTPATIENT)
Dept: PHARMACY | Age: 80
Setting detail: THERAPIES SERIES
Discharge: HOME | End: 2020-07-01
Payer: MEDICARE

## 2020-07-01 VITALS — TEMPERATURE: 97.5 F

## 2020-07-01 LAB — INR BLD: 2.6

## 2020-07-01 PROCEDURE — 99211 OFF/OP EST MAY X REQ PHY/QHP: CPT

## 2020-07-01 PROCEDURE — 85610 PROTHROMBIN TIME: CPT

## 2020-08-11 ENCOUNTER — TELEPHONE (OUTPATIENT)
Dept: PHARMACY | Age: 80
End: 2020-08-11

## 2020-08-12 ENCOUNTER — HOSPITAL ENCOUNTER (OUTPATIENT)
Dept: PHARMACY | Age: 80
Setting detail: THERAPIES SERIES
Discharge: HOME | End: 2020-08-12
Payer: MEDICARE

## 2020-08-12 VITALS — TEMPERATURE: 97.6 F

## 2020-08-12 LAB — INR BLD: 3.5

## 2020-08-12 PROCEDURE — 85610 PROTHROMBIN TIME: CPT

## 2020-08-12 PROCEDURE — 99211 OFF/OP EST MAY X REQ PHY/QHP: CPT

## 2020-08-12 NOTE — PATIENT INSTRUCTIONS
Take lower dosage tonight, then continue current dose of warfarin as instructed on dosing calendar provided. Continue to monitor urine and stool for signs and symptoms of bleeding. Please notify the clinic of any medication changes. Please remember to bring all medications (both prescription and OTC) to your next visit. Kindly notify the clinic if you are unable to make to your next appointment.

## 2020-08-12 NOTE — PROGRESS NOTES
Fingerstick INR drawn per clinic protocol. Angelica kaufman accompanied by his wife, Ez, today. Patient states no visible blood in urine and no black tarry stool. Denies any missed doses of warfarin. No change in other maintenance medications. Ez states they have not been eating spinach and asparagus like usual, but do like them and plan on going to [employer] today and will get more. Since Diego's INR is supratherapeutic today, we will have him take warfarin 2.5 mg tonight, increase greens consumption per patient's request and will continue current weekly warfarin regimen and recheck INR in 5 week(s). Patient acknowledges working in consult agreement with pharmacist as referred by his/her physician.           CLINICAL PHARMACY CONSULT: MED RECONCILIATION/REVIEW ADDENDUM    For Pharmacy Admin Tracking Only    PHSO: No  Total # of Interventions Recommended: 1  - Decreased Dose #: 1  - Maintenance Safety Lab Monitoring #: 1  Total Interventions Accepted: 1  Time Spent (min): 400 [facility], 251 [facility]

## 2020-09-15 ENCOUNTER — TELEPHONE (OUTPATIENT)
Dept: PHARMACY | Age: 80
End: 2020-09-15

## 2020-09-16 ENCOUNTER — HOSPITAL ENCOUNTER (OUTPATIENT)
Dept: PHARMACY | Age: 80
Setting detail: THERAPIES SERIES
Discharge: HOME | End: 2020-09-16
Payer: MEDICARE

## 2020-09-16 VITALS — TEMPERATURE: 97.6 F

## 2020-09-16 LAB — INR BLD: 3.6

## 2020-09-16 PROCEDURE — 85610 PROTHROMBIN TIME: CPT

## 2020-09-16 PROCEDURE — 99212 OFFICE O/P EST SF 10 MIN: CPT

## 2020-09-16 RX ORDER — WARFARIN SODIUM 5 MG/1
TABLET ORAL
Qty: 90 TABLET | Refills: 3 | Status: SHIPPED
Start: 2020-09-16 | End: 2021-01-01 | Stop reason: SDUPTHER

## 2020-09-29 ENCOUNTER — TELEPHONE (OUTPATIENT)
Dept: PHARMACY | Age: 80
End: 2020-09-29

## 2020-09-29 NOTE — TELEPHONE ENCOUNTER
COVID-19 phone screening     Call placed to screen patient prior to upcoming Medication Management visit for Anticoagulation. Does patient have fever and/or lower respiratory symptoms (SOB, difficulty breathing, cough)? [] Yes    [x] No    If yes, complete Travel Screening and ask the following:   [] [Fever OR s/sxs of lower respiratory illness]  AND  [close contact with lab-confirmed COVID-19 patient within 14 days of symptom onset   [] [Fever AND s/sxs of lower respiratory illness requiring hospitalization] AND [history of travel to [place], [place], [place], [facility], [place] within 14 days of sx onset]  [] [Fever with severe acute lower respiratory illness (I.e. PNA, ARDS) requiring hospitalization and without alternative explanatory diagnosis (I.e. Influenza)] AND [no source of exposure identified]    [x] None of the following; patient confirmed for regularly scheduled INR check and instructed to call the clinic immediately if any symptoms develop prior to upcoming appointment.

## 2020-09-30 ENCOUNTER — HOSPITAL ENCOUNTER (OUTPATIENT)
Dept: PHARMACY | Age: 80
Setting detail: THERAPIES SERIES
Discharge: HOME | End: 2020-09-30
Payer: MEDICARE

## 2020-09-30 VITALS — TEMPERATURE: 96.8 F

## 2020-09-30 LAB — INR BLD: 2.5

## 2020-09-30 PROCEDURE — 85610 PROTHROMBIN TIME: CPT

## 2020-09-30 PROCEDURE — 99211 OFF/OP EST MAY X REQ PHY/QHP: CPT

## 2020-09-30 NOTE — PROGRESS NOTES
Fingerstick INR drawn per clinic protocol. Luis Enrique Mustafa is accompanied by his wife, Bam Rey, today. Patient states no visible blood in urine and no black tarry stool. Denies any missed doses of warfarin. No change in other maintenance medications. Bam Rey states that she has been giving Luis Enrique Mustafa some gluten in his diet and she has been incorporating more greens. Since Diego's INR is therapeutic today, we will continue current weekly warfarin regimen and recheck INR in 6 week(s). Patient acknowledges working in consult agreement with pharmacist as referred by his/her physician.           CLINICAL PHARMACY CONSULT: MED RECONCILIATION/REVIEW ADDENDUM    For Pharmacy Admin Tracking Only    PHSO: No  Total # of Interventions Recommended: 0  - Maintenance Safety Lab Monitoring #: 1  Total Interventions Accepted: 0  Time Spent (min): 400 [facility], 251 [facility]

## 2020-10-20 ENCOUNTER — HOSPITAL ENCOUNTER (OUTPATIENT)
Age: 80
Discharge: HOME | End: 2020-10-22
Payer: MEDICARE

## 2020-10-20 ENCOUNTER — HOSPITAL ENCOUNTER (OUTPATIENT)
Age: 80
Discharge: HOME | End: 2020-10-20
Payer: MEDICARE

## 2020-10-20 ENCOUNTER — HOSPITAL ENCOUNTER (OUTPATIENT)
Dept: GENERAL RADIOLOGY | Age: 80
Discharge: HOME | End: 2020-10-22
Payer: MEDICARE

## 2020-10-20 LAB
ABSOLUTE EOS #: 0.1 K/UL (ref 0–0.4)
ABSOLUTE IMMATURE GRANULOCYTE: NORMAL K/UL (ref 0–0.3)
ABSOLUTE LYMPH #: 1.6 K/UL (ref 1–4.8)
ABSOLUTE MONO #: 0.7 K/UL (ref 0–1)
ALBUMIN SERPL-MCNC: 4.4 G/DL (ref 3.5–5.2)
ALBUMIN/GLOBULIN RATIO: ABNORMAL (ref 1–2.5)
ALP BLD-CCNC: 56 U/L (ref 40–129)
ALT SERPL-CCNC: 16 U/L (ref 5–41)
ANION GAP SERPL CALCULATED.3IONS-SCNC: 8 MMOL/L (ref 9–17)
AST SERPL-CCNC: 25 U/L
BASOPHILS # BLD: 0 % (ref 0–2)
BASOPHILS ABSOLUTE: 0 K/UL (ref 0–0.2)
BILIRUB SERPL-MCNC: 0.58 MG/DL (ref 0.3–1.2)
BUN BLDV-MCNC: 18 MG/DL (ref 8–23)
BUN/CREAT BLD: 16 (ref 9–20)
CALCIUM SERPL-MCNC: 9.8 MG/DL (ref 8.6–10.4)
CHLORIDE BLD-SCNC: 103 MMOL/L (ref 98–107)
CHOLESTEROL/HDL RATIO: 4.7
CHOLESTEROL: 193 MG/DL
CO2: 29 MMOL/L (ref 20–31)
CREAT SERPL-MCNC: 1.14 MG/DL (ref 0.7–1.2)
DIFFERENTIAL TYPE: YES
EKG ATRIAL RATE: 73 BPM
EKG P AXIS: 54 DEGREES
EKG P-R INTERVAL: 236 MS
EKG Q-T INTERVAL: 416 MS
EKG QRS DURATION: 154 MS
EKG QTC CALCULATION (BAZETT): 458 MS
EKG R AXIS: 9 DEGREES
EKG T AXIS: 24 DEGREES
EKG VENTRICULAR RATE: 73 BPM
EOSINOPHILS RELATIVE PERCENT: 1 % (ref 0–5)
GFR AFRICAN AMERICAN: >60 ML/MIN
GFR NON-AFRICAN AMERICAN: >60 ML/MIN
GFR SERPL CREATININE-BSD FRML MDRD: ABNORMAL ML/MIN/{1.73_M2}
GFR SERPL CREATININE-BSD FRML MDRD: ABNORMAL ML/MIN/{1.73_M2}
GLUCOSE BLD-MCNC: 109 MG/DL (ref 70–99)
HCT VFR BLD CALC: 46.4 % (ref 41–53)
HDLC SERPL-MCNC: 41 MG/DL
HEMOGLOBIN: 15.6 G/DL (ref 13.5–17.5)
IMMATURE GRANULOCYTES: NORMAL %
LDL CHOLESTEROL: 130 MG/DL (ref 0–130)
LYMPHOCYTES # BLD: 21 % (ref 13–44)
MAGNESIUM: 2.5 MG/DL (ref 1.6–2.6)
MCH RBC QN AUTO: 28.8 PG (ref 26–34)
MCHC RBC AUTO-ENTMCNC: 33.6 G/DL (ref 31–37)
MCV RBC AUTO: 85.7 FL (ref 80–100)
MONOCYTES # BLD: 9 % (ref 5–9)
NRBC AUTOMATED: NORMAL PER 100 WBC
PATIENT FASTING?: YES
PDW BLD-RTO: 13.7 % (ref 12.1–15.2)
PLATELET # BLD: 201 K/UL (ref 140–450)
PLATELET ESTIMATE: NORMAL
PMV BLD AUTO: NORMAL FL (ref 6–12)
POTASSIUM SERPL-SCNC: 4.4 MMOL/L (ref 3.7–5.3)
RBC # BLD: 5.41 M/UL (ref 4.5–5.9)
RBC # BLD: NORMAL 10*6/UL
SEG NEUTROPHILS: 69 % (ref 39–75)
SEGMENTED NEUTROPHILS ABSOLUTE COUNT: 5.3 K/UL (ref 2.1–6.5)
SODIUM BLD-SCNC: 140 MMOL/L (ref 135–144)
TOTAL PROTEIN: 7.8 G/DL (ref 6.4–8.3)
TRIGL SERPL-MCNC: 109 MG/DL
TSH SERPL DL<=0.05 MIU/L-ACNC: 1.7 MIU/L (ref 0.3–5)
VITAMIN D 25-HYDROXY: 42.3 NG/ML (ref 30–100)
VLDLC SERPL CALC-MCNC: NORMAL MG/DL (ref 1–30)
WBC # BLD: 7.8 K/UL (ref 3.5–11)
WBC # BLD: NORMAL 10*3/UL

## 2020-10-20 PROCEDURE — 85025 COMPLETE CBC W/AUTO DIFF WBC: CPT

## 2020-10-20 PROCEDURE — 36415 COLL VENOUS BLD VENIPUNCTURE: CPT

## 2020-10-20 PROCEDURE — 84443 ASSAY THYROID STIM HORMONE: CPT

## 2020-10-20 PROCEDURE — 93010 ELECTROCARDIOGRAM REPORT: CPT | Performed by: INTERNAL MEDICINE

## 2020-10-20 PROCEDURE — 71046 X-RAY EXAM CHEST 2 VIEWS: CPT

## 2020-10-20 PROCEDURE — 93005 ELECTROCARDIOGRAM TRACING: CPT

## 2020-10-20 PROCEDURE — 80053 COMPREHEN METABOLIC PANEL: CPT

## 2020-10-20 PROCEDURE — 83735 ASSAY OF MAGNESIUM: CPT

## 2020-10-20 PROCEDURE — 82306 VITAMIN D 25 HYDROXY: CPT

## 2020-10-20 PROCEDURE — 80061 LIPID PANEL: CPT

## 2020-10-27 ENCOUNTER — OFFICE VISIT (OUTPATIENT)
Dept: CARDIOLOGY CLINIC | Age: 80
End: 2020-10-27
Payer: MEDICARE

## 2020-10-27 VITALS
WEIGHT: 169 LBS | HEART RATE: 79 BPM | DIASTOLIC BLOOD PRESSURE: 80 MMHG | SYSTOLIC BLOOD PRESSURE: 160 MMHG | OXYGEN SATURATION: 94 % | BODY MASS INDEX: 25.7 KG/M2

## 2020-10-27 PROCEDURE — 1123F ACP DISCUSS/DSCN MKR DOCD: CPT | Performed by: INTERNAL MEDICINE

## 2020-10-27 PROCEDURE — 93288 INTERROG EVL PM/LDLS PM IP: CPT | Performed by: INTERNAL MEDICINE

## 2020-10-27 PROCEDURE — 1036F TOBACCO NON-USER: CPT | Performed by: INTERNAL MEDICINE

## 2020-10-27 PROCEDURE — 99214 OFFICE O/P EST MOD 30 MIN: CPT | Performed by: INTERNAL MEDICINE

## 2020-10-27 PROCEDURE — G8417 CALC BMI ABV UP PARAM F/U: HCPCS | Performed by: INTERNAL MEDICINE

## 2020-10-27 PROCEDURE — 4040F PNEUMOC VAC/ADMIN/RCVD: CPT | Performed by: INTERNAL MEDICINE

## 2020-10-27 PROCEDURE — G8427 DOCREV CUR MEDS BY ELIG CLIN: HCPCS | Performed by: INTERNAL MEDICINE

## 2020-10-27 PROCEDURE — G8484 FLU IMMUNIZE NO ADMIN: HCPCS | Performed by: INTERNAL MEDICINE

## 2020-10-27 NOTE — PROGRESS NOTES
Yoli Ion here for 6 mo f/u  No ED or hospitalizations since here last  No CP or SOB  A little dizziness when he lays down on the right side  No change in medications  No edema  Has a spot on ear wondering if he should see dermatologist, has had a spot taken off of his back and neck a few years ago  Pacemaker check per [employer]. To check bp at home call with update.   Will see in 1 year

## 2020-10-27 NOTE — LETTER
[facility]  [address], [address]  Phone: [phone]  Fax: [phone]    Vandana Sosa MD        October 27, 2020     Patient: Sweta Parker   YOB: 1940   Date of Visit: 10/27/2020       To Whom It May Concern: It is my medical opinion that Liz Hodge requires a disability parking placard for the following reasons:  He cannot walk 200 feet without stopping to rest.  Duration of need: 5 years    If you have any questions or concerns, please don't hesitate to call.     Sincerely,        Vandana Sosa MD

## 2020-11-01 NOTE — PROGRESS NOTES
Negative. Hypertension:  Positive. Hyperlipidemia:  Positive. Diabetes:  Negative. MEDICATIONS AT HOME:  He is currently on CoQ10 daily, magnesium 100 mg daily, Lopressor 25 mg half a tablet b.i.d., omega fish oil 2400 mg daily, vitamin D 2000 units daily, Coumadin as directed, zinc 15 mg daily. PAST MEDICAL AND SURGICAL HISTORY:  1. Right shoulder surgery in . 2.  Hernia repair in . 3.  Multiple kidney stones, with multiple lithotripsies. 4.  Very labile hypertension. 5.  Paroxysmal atrial fibrillation on Coumadin. 6.  Pacemaker-dependent. 7.  Three eye surgeries. 8.  CVA in 2015, from which he has made a good recovery. 9.  Ventral hernia. FAMILY HISTORY:  Mother  of mouth cancer. Mother, brother, sister all had cancer. SOCIAL HISTORY:  He is [de-identified]years old, , 2 children. Does not smoke or drink alcohol. Retired . Tries to remain active, although his wife tries to keep him off ladders, etc.    REVIEW OF SYSTEMS:  Cardiac as above. Other systems reviewed including constitutional, eyes, ears, nose and throat, cardiovascular, respiratory, GI, , musculoskeletal, integumentary, neurologic, endocrine, hematologic and allergic/immunologic are negative except for what is described above. No weight loss or weight gain. No change in bowel habits. No blood in stool. No fevers, sweats or chills. He has marked hearing impairment, although he did get new hearing aids, which has markedly helped his hearing. PHYSICAL EXAMINATION:  VITAL SIGNS:  His blood pressure was fairly elevated at 160/80 with a heart rate of 70 and regular. Respiratory rate 18. O2 sat 94%. Weight 169 pounds. GENERAL:  He is a pleasant [de-identified]year-old gentleman. Denied pain. He was oriented to person, place and time. Answered questions appropriately.   SKIN:  On his left ear, there was a pale, white papular lesion, somewhat verrucous with demarcated borders and no central discoloration. HEENT:  The pupils are equally round and intact. Mucous membranes were dry. NECK:  No JVD. Good carotid pulses. No carotid bruits. No lymphadenopathy or thyromegaly. CARDIOVASCULAR EXAM:  S1 and S2 were normal.  No S3 or S4. Soft systolic blowing type murmur. No diastolic murmur. PMI was normal.  No lift, thrust, or pericardial friction rub. LUNGS:  Quite clear to auscultation and percussion. ABDOMEN:  Soft and nontender. Good bowel sounds. EXTREMITIES:  Good femoral pulses. Good pedal pulses. No pedal edema. Skin was warm and dry. No calf tenderness. Nail beds pink. Good cap refill. PULSES:  Bilateral symmetrical radial, brachial and carotid pulses. No carotid bruits. Good femoral and pedal pulses. NEUROLOGIC EXAM:  Within normal limits. PSYCHIATRIC EXAM:  Within normal limits. LABORATORY DATA:  From 10/20/2020, sodium 140, potassium 4.4, BUN 18, creatinine 1.14, GFR greater than 60, magnesium 2.5, glucose 109, calcium 9.8. Cholesterol was 193 with an HDL of 41, , triglycerides 109. ALT was 16, AST was 25. TSH was 1.70. Vitamin D was 42.3. White count 7.8, hemoglobin 15.6 a platelet count of 166,384. His EKG showed atrial paced rhythm with a right bundle-branch block, with no change from previous EKGs. Chest x-ray was unremarkable. IMPRESSION:  1. Sick sinus syndrome. 2.  Status post MRI-compatible pacemaker implanted on 11/20/2013, with being a [employer] pacemaker, still at beginning of life although approaching end of life. 3.  Paroxysmal atrial fibrillation, on Coumadin. 4.  CVA on 04/19/2015, after stopping Coumadin, where he developed dysphasia, which for the most part has resolved. 5.  Very labile hypertension, elevated in our office, with him agreeing to take it at home. 6.  Hyperlipidemia, untreated. 7.  Ventral hernia. 8.  Marked hearing impairment, with him getting new hearing aids, which has markedly helped. PLAN:  1.   Continue to

## 2020-11-09 ENCOUNTER — TELEPHONE (OUTPATIENT)
Dept: PHARMACY | Age: 80
End: 2020-11-09

## 2020-11-09 NOTE — TELEPHONE ENCOUNTER
COVID-19 phone screening     Call placed to screen patient prior to upcoming Medication Management visit for Anticoagulation. Does patient have fever and/or lower respiratory symptoms (SOB, difficulty breathing, cough)? [] Yes    [x] No    If yes, complete Travel Screening and ask the following:   [] [Fever OR s/sxs of lower respiratory illness]  AND  [close contact with lab-confirmed COVID-19 patient within 14 days of symptom onset   [] [Fever AND s/sxs of lower respiratory illness requiring hospitalization] AND [history of travel to [place], [place], [place], [facility], [place] within 14 days of sx onset]  [] [Fever with severe acute lower respiratory illness (I.e. PNA, ARDS) requiring hospitalization and without alternative explanatory diagnosis (I.e. Influenza)] AND [no source of exposure identified]    [x] None of the following; patient confirmed for regularly scheduled INR check and instructed to call the clinic immediately if any symptoms develop prior to upcoming appointment.

## 2020-11-10 ENCOUNTER — HOSPITAL ENCOUNTER (OUTPATIENT)
Dept: PHARMACY | Age: 80
Setting detail: THERAPIES SERIES
Discharge: HOME | End: 2020-11-10
Payer: MEDICARE

## 2020-11-10 VITALS
HEART RATE: 73 BPM | DIASTOLIC BLOOD PRESSURE: 78 MMHG | WEIGHT: 170.4 LBS | BODY MASS INDEX: 25.91 KG/M2 | SYSTOLIC BLOOD PRESSURE: 138 MMHG

## 2020-11-10 LAB — INR BLD: 2.5

## 2020-11-10 PROCEDURE — 85610 PROTHROMBIN TIME: CPT

## 2020-11-10 PROCEDURE — 99211 OFF/OP EST MAY X REQ PHY/QHP: CPT

## 2020-11-10 NOTE — PROGRESS NOTES
Fingerstick INR drawn per clinic protocol. Patient states no visible blood in urine and no black tarry stool. Mechelle Waters states he did have a couple of days of bright red in his stool, but it went away. Denies any missed doses of warfarin. No change in other maintenance medications or in diet. Mechelle Waters saw Dr. Karen Segura on 10/27 and Vera Vegas states she has been watching his BP at home. She prefers not to monitor at home because of covid and the election, she feels it elevates his BP. BP= 138/78 today in our office. She request Milagros Nam at Dr. Shashi Fontana know this information. Mechelle Waters does NOT take the seasonal influenza vaccine. Since Diego's INR remains therapeutic, we will continue current weekly warfarin regimen and recheck INR in 6 week(s). Patient acknowledges working in consult agreement with pharmacist as referred by his/her physician.           CLINICAL PHARMACY CONSULT: MED RECONCILIATION/REVIEW ADDENDUM    For Pharmacy Admin Tracking Only    PHSO: No  Total # of Interventions Recommended: 0  - Maintenance Safety Lab Monitoring #: 1  Total Interventions Accepted: 0  Time Spent (min): 400 [facility], 251 [facility]

## 2020-12-21 ENCOUNTER — TELEPHONE (OUTPATIENT)
Dept: PHARMACY | Age: 80
End: 2020-12-21

## 2020-12-21 NOTE — TELEPHONE ENCOUNTER
COVID-19 phone screening     Call placed to screen patient prior to upcoming Medication Management visit for Anticoagulation. Does patient have fever and/or lower respiratory symptoms (SOB, difficulty breathing, cough)? [] Yes    [x] No    If yes, complete Travel Screening and ask the following:   [] [Fever OR s/sxs of lower respiratory illness]  AND  [close contact with lab-confirmed COVID-19 patient within 14 days of symptom onset   [] [Fever AND s/sxs of lower respiratory illness requiring hospitalization] AND [history of travel to [place], [place], [place], [facility], [place] within 14 days of sx onset]  [] [Fever with severe acute lower respiratory illness (I.e. PNA, ARDS) requiring hospitalization and without alternative explanatory diagnosis (I.e. Influenza)] AND [no source of exposure identified]    [x] None of the following; patient confirmed for regularly scheduled INR check and instructed to call the clinic immediately if any symptoms develop prior to upcoming appointment.

## 2020-12-22 ENCOUNTER — HOSPITAL ENCOUNTER (OUTPATIENT)
Dept: PHARMACY | Age: 80
Setting detail: THERAPIES SERIES
Discharge: HOME | End: 2020-12-22
Payer: MEDICARE

## 2020-12-22 VITALS
HEART RATE: 77 BPM | BODY MASS INDEX: 26.67 KG/M2 | SYSTOLIC BLOOD PRESSURE: 156 MMHG | WEIGHT: 175.4 LBS | DIASTOLIC BLOOD PRESSURE: 90 MMHG

## 2020-12-22 LAB — INR BLD: 3.1

## 2020-12-22 PROCEDURE — 99211 OFF/OP EST MAY X REQ PHY/QHP: CPT

## 2020-12-22 PROCEDURE — 85610 PROTHROMBIN TIME: CPT

## 2020-12-22 NOTE — PROGRESS NOTES
Fingerstick INR drawn per clinic protocol. Patient states no visible blood in urine and no black tarry stool. Denies any missed doses of warfarin. No change in other maintenance medications or in diet. Diego's wife, Leah Kaufman, says she had been preparing \"a lot more\" vegetables, including salads and other \"greens\", but hasn't been doing as much of that recently. She says she will start incorporating more salads and other vegetables back into their dietary routine as they had been previously. For now, we will have him take 2.5 mg warfarin tonight instead of 5 mg, but then resume 2.5 mg warfarin on Wednesdays and 5 mg warfarin all other days of the week. We will recheck INR in 6 weeks. Patient acknowledges working in consult agreement with pharmacist as referred by his/her physician.       CLINICAL PHARMACY CONSULT: MED RECONCILIATION/REVIEW ADDENDUM    For Pharmacy Admin Tracking Only    PHSO: No  Total # of Interventions Recommended: 1  - Decreased Dose #: 1  - Maintenance Safety Lab Monitoring #: 1    Total Interventions Accepted: 1  Time Spent (min): 30    Ayaka Frost, MihirD

## 2021-01-01 ENCOUNTER — HOSPITAL ENCOUNTER (OUTPATIENT)
Dept: PHARMACY | Age: 81
Setting detail: THERAPIES SERIES
Discharge: HOME | End: 2021-04-29
Payer: MEDICARE

## 2021-01-01 ENCOUNTER — HOSPITAL ENCOUNTER (OUTPATIENT)
Dept: PHARMACY | Age: 81
Setting detail: THERAPIES SERIES
Discharge: HOME | End: 2021-12-02
Payer: MEDICARE

## 2021-01-01 ENCOUNTER — HOSPITAL ENCOUNTER (OUTPATIENT)
Dept: PHARMACY | Age: 81
Setting detail: THERAPIES SERIES
Discharge: HOME | End: 2021-09-02
Payer: MEDICARE

## 2021-01-01 ENCOUNTER — HOSPITAL ENCOUNTER (OUTPATIENT)
Dept: PHARMACY | Age: 81
Setting detail: THERAPIES SERIES
Discharge: HOME | End: 2021-07-22
Payer: MEDICARE

## 2021-01-01 ENCOUNTER — HOSPITAL ENCOUNTER (OUTPATIENT)
Dept: PHARMACY | Age: 81
Setting detail: THERAPIES SERIES
Discharge: HOME | End: 2021-03-18
Payer: MEDICARE

## 2021-01-01 ENCOUNTER — HOSPITAL ENCOUNTER (OUTPATIENT)
Age: 81
Discharge: HOME | End: 2021-02-27
Payer: MEDICARE

## 2021-01-01 ENCOUNTER — HOSPITAL ENCOUNTER (OUTPATIENT)
Dept: PHARMACY | Age: 81
Setting detail: THERAPIES SERIES
Discharge: HOME | End: 2021-02-04
Payer: MEDICARE

## 2021-01-01 ENCOUNTER — HOSPITAL ENCOUNTER (OUTPATIENT)
Dept: PHARMACY | Age: 81
Setting detail: THERAPIES SERIES
Discharge: HOME | End: 2021-10-14
Payer: MEDICARE

## 2021-01-01 ENCOUNTER — TELEPHONE (OUTPATIENT)
Dept: PHARMACY | Age: 81
End: 2021-01-01

## 2021-01-01 ENCOUNTER — HOSPITAL ENCOUNTER (OUTPATIENT)
Age: 81
Discharge: HOME | End: 2021-10-06
Payer: MEDICARE

## 2021-01-01 ENCOUNTER — HOSPITAL ENCOUNTER (OUTPATIENT)
Dept: GENERAL RADIOLOGY | Age: 81
Discharge: HOME | End: 2021-10-08
Payer: MEDICARE

## 2021-01-01 ENCOUNTER — OFFICE VISIT (OUTPATIENT)
Dept: CARDIOLOGY CLINIC | Age: 81
End: 2021-01-01
Payer: MEDICARE

## 2021-01-01 ENCOUNTER — HOSPITAL ENCOUNTER (OUTPATIENT)
Dept: PHARMACY | Age: 81
Setting detail: THERAPIES SERIES
Discharge: HOME | End: 2021-06-10
Payer: MEDICARE

## 2021-01-01 ENCOUNTER — HOSPITAL ENCOUNTER (OUTPATIENT)
Age: 81
Discharge: HOME | End: 2021-10-08
Payer: MEDICARE

## 2021-01-01 ENCOUNTER — ANTI-COAG VISIT (OUTPATIENT)
Dept: PHARMACY | Age: 81
End: 2021-01-01

## 2021-01-01 ENCOUNTER — HOSPITAL ENCOUNTER (OUTPATIENT)
Dept: GENERAL RADIOLOGY | Age: 81
Discharge: HOME | End: 2021-02-27
Payer: MEDICARE

## 2021-01-01 VITALS
DIASTOLIC BLOOD PRESSURE: 72 MMHG | SYSTOLIC BLOOD PRESSURE: 123 MMHG | BODY MASS INDEX: 26.3 KG/M2 | HEART RATE: 72 BPM | WEIGHT: 173 LBS

## 2021-01-01 VITALS
SYSTOLIC BLOOD PRESSURE: 131 MMHG | HEART RATE: 74 BPM | WEIGHT: 171.4 LBS | DIASTOLIC BLOOD PRESSURE: 76 MMHG | BODY MASS INDEX: 26.06 KG/M2

## 2021-01-01 VITALS
BODY MASS INDEX: 25.48 KG/M2 | HEART RATE: 73 BPM | WEIGHT: 167.6 LBS | SYSTOLIC BLOOD PRESSURE: 140 MMHG | DIASTOLIC BLOOD PRESSURE: 87 MMHG

## 2021-01-01 VITALS
DIASTOLIC BLOOD PRESSURE: 81 MMHG | SYSTOLIC BLOOD PRESSURE: 126 MMHG | BODY MASS INDEX: 26.67 KG/M2 | WEIGHT: 175.4 LBS | HEART RATE: 73 BPM

## 2021-01-01 VITALS
BODY MASS INDEX: 25.3 KG/M2 | HEART RATE: 73 BPM | WEIGHT: 166.4 LBS | DIASTOLIC BLOOD PRESSURE: 77 MMHG | SYSTOLIC BLOOD PRESSURE: 130 MMHG

## 2021-01-01 VITALS
HEART RATE: 74 BPM | DIASTOLIC BLOOD PRESSURE: 83 MMHG | WEIGHT: 171.8 LBS | BODY MASS INDEX: 26.12 KG/M2 | SYSTOLIC BLOOD PRESSURE: 137 MMHG

## 2021-01-01 VITALS
BODY MASS INDEX: 26.67 KG/M2 | DIASTOLIC BLOOD PRESSURE: 83 MMHG | SYSTOLIC BLOOD PRESSURE: 125 MMHG | HEART RATE: 71 BPM | WEIGHT: 175.4 LBS

## 2021-01-01 VITALS
SYSTOLIC BLOOD PRESSURE: 122 MMHG | DIASTOLIC BLOOD PRESSURE: 74 MMHG | HEART RATE: 72 BPM | WEIGHT: 177 LBS | BODY MASS INDEX: 26.91 KG/M2

## 2021-01-01 VITALS
SYSTOLIC BLOOD PRESSURE: 150 MMHG | HEART RATE: 84 BPM | DIASTOLIC BLOOD PRESSURE: 80 MMHG | BODY MASS INDEX: 25.09 KG/M2 | OXYGEN SATURATION: 94 % | WEIGHT: 165 LBS

## 2021-01-01 DIAGNOSIS — E78.49 OTHER HYPERLIPIDEMIA: ICD-10-CM

## 2021-01-01 DIAGNOSIS — E55.9 VITAMIN D DEFICIENCY DISEASE: ICD-10-CM

## 2021-01-01 DIAGNOSIS — I10 ESSENTIAL HYPERTENSION: ICD-10-CM

## 2021-01-01 DIAGNOSIS — I63.512 ACUTE ISCHEMIC LEFT MCA STROKE (HCC): ICD-10-CM

## 2021-01-01 DIAGNOSIS — I48.0 PAROXYSMAL ATRIAL FIBRILLATION (HCC): ICD-10-CM

## 2021-01-01 DIAGNOSIS — I63.512 ACUTE ISCHEMIC LEFT MCA STROKE (HCC): Primary | ICD-10-CM

## 2021-01-01 DIAGNOSIS — I10 ESSENTIAL HYPERTENSION: Primary | ICD-10-CM

## 2021-01-01 DIAGNOSIS — Z79.01 LONG TERM CURRENT USE OF ANTICOAGULANT THERAPY: ICD-10-CM

## 2021-01-01 DIAGNOSIS — Z95.0 PACEMAKER: ICD-10-CM

## 2021-01-01 DIAGNOSIS — M17.12 OSTEOARTHRITIS OF LEFT KNEE, UNSPECIFIED OSTEOARTHRITIS TYPE: ICD-10-CM

## 2021-01-01 DIAGNOSIS — I48.91 ATRIAL FIBRILLATION, UNSPECIFIED TYPE (HCC): ICD-10-CM

## 2021-01-01 LAB
ABSOLUTE EOS #: 0.2 K/UL (ref 0–0.4)
ABSOLUTE IMMATURE GRANULOCYTE: ABNORMAL K/UL (ref 0–0.3)
ABSOLUTE LYMPH #: 1.8 K/UL (ref 1–4.8)
ABSOLUTE MONO #: 0.8 K/UL (ref 0–1)
ALBUMIN SERPL-MCNC: 4 G/DL (ref 3.5–5.2)
ALBUMIN/GLOBULIN RATIO: ABNORMAL (ref 1–2.5)
ALP BLD-CCNC: 51 U/L (ref 40–129)
ALT SERPL-CCNC: 13 U/L (ref 5–41)
ANION GAP SERPL CALCULATED.3IONS-SCNC: 8 MMOL/L (ref 9–17)
AST SERPL-CCNC: 20 U/L
BASOPHILS # BLD: 1 % (ref 0–2)
BASOPHILS ABSOLUTE: 0 K/UL (ref 0–0.2)
BILIRUB SERPL-MCNC: 0.77 MG/DL (ref 0.3–1.2)
BUN BLDV-MCNC: 19 MG/DL (ref 8–23)
BUN/CREAT BLD: 15 (ref 9–20)
CALCIUM SERPL-MCNC: 9.7 MG/DL (ref 8.6–10.4)
CHLORIDE BLD-SCNC: 103 MMOL/L (ref 98–107)
CHOLESTEROL/HDL RATIO: 5.1
CHOLESTEROL: 184 MG/DL
CO2: 28 MMOL/L (ref 20–31)
CREAT SERPL-MCNC: 1.23 MG/DL (ref 0.7–1.2)
DIFFERENTIAL TYPE: YES
EKG ATRIAL RATE: 71 BPM
EKG P AXIS: 92 DEGREES
EKG P-R INTERVAL: 234 MS
EKG Q-T INTERVAL: 428 MS
EKG QRS DURATION: 160 MS
EKG QTC CALCULATION (BAZETT): 465 MS
EKG R AXIS: -2 DEGREES
EKG T AXIS: 19 DEGREES
EKG VENTRICULAR RATE: 71 BPM
EOSINOPHILS RELATIVE PERCENT: 3 % (ref 0–5)
GFR AFRICAN AMERICAN: >60 ML/MIN
GFR NON-AFRICAN AMERICAN: 56 ML/MIN
GFR SERPL CREATININE-BSD FRML MDRD: ABNORMAL ML/MIN/{1.73_M2}
GFR SERPL CREATININE-BSD FRML MDRD: ABNORMAL ML/MIN/{1.73_M2}
GLUCOSE BLD-MCNC: 96 MG/DL (ref 70–99)
HCT VFR BLD CALC: 44.7 % (ref 41–53)
HDLC SERPL-MCNC: 36 MG/DL
HEMOGLOBIN: 15 G/DL (ref 13.5–17.5)
IMMATURE GRANULOCYTES: ABNORMAL %
INR BLD: 2.3
INR BLD: 2.5
INR BLD: 3
INR BLD: 3.2
INR BLD: 3.2
INR BLD: 3.6
LDL CHOLESTEROL: 127 MG/DL (ref 0–130)
LYMPHOCYTES # BLD: 27 % (ref 13–44)
MAGNESIUM: 2.3 MG/DL (ref 1.6–2.6)
MCH RBC QN AUTO: 29.2 PG (ref 26–34)
MCHC RBC AUTO-ENTMCNC: 33.6 G/DL (ref 31–37)
MCV RBC AUTO: 87 FL (ref 80–100)
MONOCYTES # BLD: 11 % (ref 5–9)
NRBC AUTOMATED: ABNORMAL PER 100 WBC
PATIENT FASTING?: YES
PDW BLD-RTO: 14.2 % (ref 12.1–15.2)
PLATELET # BLD: 177 K/UL (ref 140–450)
PLATELET ESTIMATE: ABNORMAL
PMV BLD AUTO: ABNORMAL FL (ref 6–12)
POTASSIUM SERPL-SCNC: 3.9 MMOL/L (ref 3.7–5.3)
RBC # BLD: 5.14 M/UL (ref 4.5–5.9)
RBC # BLD: ABNORMAL 10*6/UL
SEG NEUTROPHILS: 58 % (ref 39–75)
SEGMENTED NEUTROPHILS ABSOLUTE COUNT: 4 K/UL (ref 2.1–6.5)
SODIUM BLD-SCNC: 139 MMOL/L (ref 135–144)
TOTAL PROTEIN: 7.2 G/DL (ref 6.4–8.3)
TRIGL SERPL-MCNC: 105 MG/DL
TSH SERPL DL<=0.05 MIU/L-ACNC: 1.61 MIU/L (ref 0.3–5)
VITAMIN D 25-HYDROXY: 65.8 NG/ML (ref 30–100)
VLDLC SERPL CALC-MCNC: ABNORMAL MG/DL (ref 1–30)
WBC # BLD: 6.8 K/UL (ref 3.5–11)
WBC # BLD: ABNORMAL 10*3/UL

## 2021-01-01 PROCEDURE — 80053 COMPREHEN METABOLIC PANEL: CPT

## 2021-01-01 PROCEDURE — 4040F PNEUMOC VAC/ADMIN/RCVD: CPT | Performed by: INTERNAL MEDICINE

## 2021-01-01 PROCEDURE — 85610 PROTHROMBIN TIME: CPT

## 2021-01-01 PROCEDURE — 85025 COMPLETE CBC W/AUTO DIFF WBC: CPT

## 2021-01-01 PROCEDURE — 83735 ASSAY OF MAGNESIUM: CPT

## 2021-01-01 PROCEDURE — 93005 ELECTROCARDIOGRAM TRACING: CPT

## 2021-01-01 PROCEDURE — 82306 VITAMIN D 25 HYDROXY: CPT

## 2021-01-01 PROCEDURE — 99211 OFF/OP EST MAY X REQ PHY/QHP: CPT

## 2021-01-01 PROCEDURE — 71046 X-RAY EXAM CHEST 2 VIEWS: CPT

## 2021-01-01 PROCEDURE — G8417 CALC BMI ABV UP PARAM F/U: HCPCS | Performed by: INTERNAL MEDICINE

## 2021-01-01 PROCEDURE — G8484 FLU IMMUNIZE NO ADMIN: HCPCS | Performed by: INTERNAL MEDICINE

## 2021-01-01 PROCEDURE — G8428 CUR MEDS NOT DOCUMENT: HCPCS | Performed by: INTERNAL MEDICINE

## 2021-01-01 PROCEDURE — 1123F ACP DISCUSS/DSCN MKR DOCD: CPT | Performed by: INTERNAL MEDICINE

## 2021-01-01 PROCEDURE — 73564 X-RAY EXAM KNEE 4 OR MORE: CPT

## 2021-01-01 PROCEDURE — 84443 ASSAY THYROID STIM HORMONE: CPT

## 2021-01-01 PROCEDURE — 80061 LIPID PANEL: CPT

## 2021-01-01 PROCEDURE — 93010 ELECTROCARDIOGRAM REPORT: CPT | Performed by: INTERNAL MEDICINE

## 2021-01-01 PROCEDURE — 1036F TOBACCO NON-USER: CPT | Performed by: INTERNAL MEDICINE

## 2021-01-01 PROCEDURE — 99214 OFFICE O/P EST MOD 30 MIN: CPT | Performed by: INTERNAL MEDICINE

## 2021-01-01 PROCEDURE — 93288 INTERROG EVL PM/LDLS PM IP: CPT | Performed by: INTERNAL MEDICINE

## 2021-01-01 PROCEDURE — 36415 COLL VENOUS BLD VENIPUNCTURE: CPT

## 2021-01-01 RX ORDER — WARFARIN SODIUM 5 MG/1
TABLET ORAL
Qty: 90 TABLET | Refills: 3 | Status: SHIPPED
Start: 2021-01-01

## 2021-01-01 RX ORDER — CYPROHEPTADINE HYDROCHLORIDE 4 MG/1
4 TABLET ORAL 2 TIMES DAILY
Qty: 60 TABLET | Refills: 3 | Status: SHIPPED | OUTPATIENT
Start: 2021-01-01

## 2021-01-01 RX ORDER — WARFARIN SODIUM 5 MG/1
TABLET ORAL
Qty: 90 TABLET | Refills: 3 | Status: SHIPPED | OUTPATIENT
Start: 2021-01-01 | End: 2021-01-01 | Stop reason: DRUGHIGH

## 2021-01-01 RX ORDER — WARFARIN SODIUM 5 MG/1
TABLET ORAL
Qty: 90 TABLET | Refills: 3 | Status: SHIPPED | OUTPATIENT
Start: 2021-01-01 | End: 2021-01-01 | Stop reason: SDUPTHER

## 2021-02-03 NOTE — TELEPHONE ENCOUNTER
COVID-19 phone screening     Call placed to screen patient prior to upcoming Medication Management visit for Anticoagulation. Does patient have fever and/or lower respiratory symptoms (SOB, difficulty breathing, cough)? [] Yes    [x] No    If yes, complete Travel Screening and ask the following:   [] [Fever OR s/sxs of lower respiratory illness]  AND  [close contact with lab-confirmed COVID-19 patient within 14 days of symptom onset   [] [Fever AND s/sxs of lower respiratory illness requiring hospitalization] AND [history of travel to [place], [place], [place], [facility], [place] within 14 days of sx onset]  [] [Fever with severe acute lower respiratory illness (I.e. PNA, ARDS) requiring hospitalization and without alternative explanatory diagnosis (I.e. Influenza)] AND [no source of exposure identified]    [x] None of the following; patient confirmed for regularly scheduled INR check and instructed to call the clinic immediately if any symptoms develop prior to upcoming appointment.

## 2021-02-04 NOTE — PROGRESS NOTES
Fingerstick INR drawn per clinic protocol. Patient states no visible blood in urine and no black tarry stool. Denies any missed doses of warfarin. No change in other maintenance medications or in diet. Kim Eisenmenger and Ez haven't decided if they are going to receive the covid vaccine but might when it is one dose. Since Diego's INR remains therapeutic, we will continue current weekly warfarin regimen (2.5 mg W, 5 mg daily) and recheck INR in 6 week(s). Patient acknowledges working in consult agreement with pharmacist as referred by his/her physician.           CLINICAL PHARMACY CONSULT: MED RECONCILIATION/REVIEW ADDENDUM    For Pharmacy Admin Tracking Only    PHSO: No  Total # of Interventions Recommended: 0  - Maintenance Safety Lab Monitoring #: 1  Total Interventions Accepted: 0  Time Spent (min): 400 [facility], 251 [facility]

## 2021-03-18 NOTE — PROGRESS NOTES
Fingerstick INR drawn per clinic protocol. Patient states no visible blood in urine and no black tarry stool. Denies any missed doses of warfarin. No change in diet, Luann Dixon states we continue to eat a diet rich in vitamin K. Tiffanie states MARIANNE received a \"cortisone shot\" in his left knee from dr. Frances. Luannlupe Dixon also states MARIANNE started taking an herbal supplement that contains marshmallow root and other ingredients for neuropathy of the feet. Since Diego's INR remains therapeutic, we will continue current weekly warfarin regimen (2.5 mg Wednesdays and 5 mg elliott all other days of the week) and recheck INR in 6 week(s). Patient acknowledges working in consult agreement with pharmacist as referred by his/her physician.           CLINICAL PHARMACY CONSULT: MED RECONCILIATION/REVIEW ADDENDUM    For Pharmacy Admin Tracking Only    PHSO: No  Total # of Interventions Recommended: 1  - New Order #: 1 New Medication Order Reason(s): Patient Preference  - Maintenance Safety Lab Monitoring #: 1  Total Interventions Accepted: 0  Time Spent (min): 400 [facility], 251 [facility]

## 2021-04-29 NOTE — PROGRESS NOTES
John [facility]/[place]  Medication Management  ANTICOAGULATION    Referring Provider: Dr. Anthony Smith INR: 2.0-3.0    TODAY'S INR: 3.0    WARFARIN Dosage: 2.5 mg Wed and 5 mg daily all other days of the week     INR (no units)   Date Value   2021 3   2021 2.5   2021 2.3   2020 3.1   11/10/2020 2.5   2020 2.5   2020 3.6       Medication changes:  none  Notes:    Fingerstick INR drawn per clinic protocol. Patient states no visible blood in urine and no black tarry stool. Denies any missed doses of warfarin. No change in other maintenance medications or in diet. Since Diego's INR remains therapeutic, we will continue current weekly warfarin regimen and will recheck INR in 6 weeks. Patient acknowledges working in consult agreement with pharmacist as referred by his/her physician.                   CLINICAL PHARMACY CONSULT: MED RECONCILIATION/REVIEW ADDENDUM    For Pharmacy Ankur Carrillo 22 Tracking Only     Intervention Detail: Adherence Monitorin   Total # of Interventions Recommended: 0   Total # of Interventions Accepted: 0   Time Spent (min): 66 [place], PharmD

## 2021-07-22 NOTE — PROGRESS NOTES
John [address] [place] [address]/[place]  Medication Management  ANTICOAGULATION    Referring Provider: Dr. María Vu INR: 2.0-3.0    TODAY'S INR: 3.2    WARFARIN Dosage: 2.5 mg x 1 dose then 2.5 mg  and 5 mg daily all other days    INR (no units)   Date Value   2021 3.2   06/10/2021 2.5   2021 3   2021 2.5   2021 2.3   2020 3.1   11/10/2020 2.5       Medication changes:  none  Notes:    Fingerstick INR drawn per clinic protocol. Patient states no visible blood in urine and no black tarry stool. Simón Dress states he missed 1 dose of warfarin soon after our last visit 6 week ago. No change in other maintenance medications. Maria Esther Payton states they have not been eating as many greens as they are expensive to buy and spoil by the time they eat them. Since Diego's INR is supratherapeutic, but he intends to increase greens consumption, we will have him take warfarin 2.5 mg tonight, then will continue current weekly warfarin regimen and will recheck INR in 6 weeks. Patient acknowledges working in consult agreement with pharmacist as referred by his/her physician.                   For Pharmacy Admin Tracking Only     Intervention Detail: Adherence Monitorin   Total # of Interventions Recommended: 0   Total # of Interventions Accepted: 0   Time Spent (min): 5346 Karishma Olivier, [facility], PharmD

## 2021-07-22 NOTE — PATIENT INSTRUCTIONS
Take a lower dosage tonight and then continue current dose of warfarin as instructed on dosing calendar provided. Continue to monitor urine and stool for signs and symptoms of bleeding. Please notify the clinic of any medication changes. Please remember to bring all medications (both prescription and OTC) to your next visit. Kindly notify the clinic if you are unable to make to your next appointment.

## 2021-09-02 NOTE — PROGRESS NOTES
John [address] [place] [address]/[place]  Medication Management  ANTICOAGULATION    Referring Provider: Dr. Alcides Hernandez INR: 2.0-3.0     TODAY'S INR: 3.6     WARFARIN Dosage: 2.5 mg x 1 dose then 2.5 mg  and Sundays and 5 mg daily all other days    INR (no units)   Date Value   2021 3.6   2021 3.2   06/10/2021 2.5   2021 3   2021 2.5   2021 2.3   2020 3.1       Medication changes:  None    Notes:    Fingerstick INR drawn per clinic protocol. Patient states no visible blood in urine and no black tarry stool. No change in other maintenance medications. As discussed previously, Azeem Justin states again that they have not been eating as many greens since she is not cooking as much as she had been previously due to back pain. She says they used to eat \"asparagus\" and \"salads\" all the time and now they only have \"greens\" \"maybe once per week\". Since Diego's INR is slightly supra-therapeutic again today, they have agreed to take only 2.5 mg warfarin tonight and also decrease current weekly warfarin regimen (7.7%) as noted on his dosing calendar.  We will recheck INR in 6 weeks. Patient acknowledges working in consult agreement with pharmacist as referred by his/her physician                For Pharmacy Admin Tracking Only     Intervention Detail: Adherence Monitorin and Dose Adjustment: 1, reason: Therapy Optimization   Total # of Interventions Recommended: 2   Total # of Interventions Accepted: 2   Time Spent (min): [address] [place] [address], [facility], PharmD

## 2021-10-12 NOTE — PROGRESS NOTES
Dr. Todd Blake for one year follow up   Pacer checked today by [employer]   No hospitalizations/procedures/er visits  No chest pain or heaviness   No sob   C/o dizziness when bending over  Energy level same   occ edema but \"not much\"   C/o rash itchy \"long time\"      No changes    Follow up in one year

## 2021-10-14 NOTE — PROGRESS NOTES
Feliberto Shaw M.D. [address] [place] [address] [place] [address]  [phone]          2021          Darlyn Escamilla DO   [facility] [address] [place] [address]      RE:   Antwan Arriaza  :  1940      Dear Dr. Yoana Craft:    CHIEF COMPLAINT:  1. Paroxysmal atrial fibrillation, on Coumadin. 2.  Sick sinus syndrome, status post Medtronic MRI-compatible pacemaker placed on 2013, approaching ODALIS. HISTORY OF PRESENT ILLNESS:  I had the pleasure of seeing Mr. Antwan Arriaza in the office on 10/12/2021. He is a pleasant 80year-old gentleman who had paroxysmal atrial fibrillation with RVR along with sick sinus syndrome. He was placed on Coumadin for paroxysmal atrial fibrillation in . He stopped the Coumadin on 2015. He had subsequent CVA, was transferred to [facility]. [facility] with dysphasia. He also had mild weakness in the right arm. He did restart Coumadin, he has been on it since that time. 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 He has never had a myocardial infarction or cardiac catheterization. He has done well over this year. He has had no hospitalizations or procedures. He is not particularly active but does have a perfect man cave. .. His basement has an 81-inch high-definition TV. His wife never comes to the basement and has no idea what he does. Rob Streeter .(I asked him if I could come and hang out in his man cave. ..). Energy level is about the same. He has occasional mild edema. He does have a slight rash on his arms and on his feet, although it is much less than it had been last year. He denies any syncope, near syncope or palpitations. He had no bleeding, no complications from his Coumadin. CARDIAC RISK FACTORS:  Prior MI:  Negative. Other Family Members:  Positive. Peripheral Vascular Disease:  Positive. Smoking:  Negative. Hypertension:  Positive. Hyperlipidemia:  Positive.   Diabetes: Negative. MEDICATIONS AT THIS TIME:  He is on CoQ10 daily, Periactin 4 mg p.r.n., magnesium 100 mg daily, Lopressor 25 mg half a tablet b.i.d., omega fish oil 2400 mg daily, vitamin D 2000 units daily, Coumadin as directed, and zinc daily. PAST MEDICAL AND SURGICAL HISTORY:  1. Right shoulder surgery in . 2.  Hernia repair in . 3.  He has had multiple kidney stones with multiple lithotripsies. 4.  Very labile hypertension. 5.  Paroxysmal atrial fibrillation, on Coumadin. 6.  Pacer dependence with his pacemaker close to [facility]. 7.  Three eye surgeries. 8.  CVA in 2015, from which he has made a good recovery. 9.  Ventral hernia. FAMILY HISTORY:  Mother  of mouth cancer. Mother, brother, sister all had cancer. SOCIAL HISTORY:  He is 80years old, , 2 children. Does not smoke or drink alcohol. Retired . Tries to stay active, although he has slowed down in the last several years. REVIEW OF SYSTEMS:  Cardiac as above. Other systems reviewed including constitutional, eyes, ears, nose and throat, cardiovascular, respiratory, GI, , musculoskeletal, integumentary, neurologic, endocrine, hematologic and allergic/immunologic are negative except for what is described above. No weight loss or weight gain. No change in bowel habits. No blood in stools. No fevers, sweats or chills. PHYSICAL EXAMINATION:  VITAL SIGNS:  His blood pressure was 150/80 with a heart rate of 84 and regular. Respiratory rate 18. O2 saturation 94%. Weight 165 pounds. GENERAL:  He is a pleasant 80year-old gentleman. Denied pain. He was oriented to person, place and time. Answered questions appropriately. SKIN:  No unusual skin changes. HEENT:  The pupils are equally round and intact. Mucous membranes were dry. NECK:  No JVD. Good carotid pulses. No carotid bruits. No lymphadenopathy or thyromegaly. CARDIOVASCULAR EXAM:  S1 and S2 were normal.  No S3 or S4.   Soft systolic blowing type murmur. No diastolic murmur. PMI was normal.  No lift, thrust, or pericardial friction rub. LUNGS:  Quite clear to auscultation and percussion. ABDOMEN:  Soft and nontender. Good bowel sounds. EXTREMITIES:  Good femoral pulses. Good pedal pulses. No pedal edema. Skin was warm and dry. No calf tenderness. Nail beds pink. Good cap refill. PULSES:  Bilateral symmetrical radial, brachial and carotid pulses. No carotid bruits. Good femoral and pedal pulses. NEUROLOGIC EXAM:  Within normal limits. PSYCHIATRIC EXAM:  Within normal limits. LABORATORY DATA:  From 10/06/2021, sodium 139, potassium 3.9, BUN 19, creatinine 1.23. GFR was 56. Cholesterol 184, HDL 36, , triglycerides 105. ALT was 13, AST was 20. TSH was 1.61. Vitamin D 65.8. White count 6.8, hemoglobin 15.0 with a platelet count 335,961. His chest x-ray was unremarkable. EKG showed atrial paced rhythm with prolonged AV conduction. IMPRESSION:  1. Sick sinus syndrome. 2.  Status post MRI-compatible pacemaker implanted on 11/20/2013, being a Medtronic pacemaker, still at beginning of life, although approaching end of life. 3.  Paroxysmal atrial fibrillation, on Coumadin. 4.  CVA, on 04/19/2015, after stopping Coumadin, when he developed dysphasia, which has almost totally resolved. 5.  Very labile hypertension. 6.  Hyperlipidemia, untreated. 7.  Ventral hernia. 8.  Marked hearing impairment. PLAN:  1. Continue to check his pacemaker for ODALIS. 2.  We will plan on seeing in 1 year. DISCUSSION:  Mr. Tatiana Coles clinically is doing well. He has developed some rash, which is fairly itchy, although much improved from what it was last year. We will give him Periactin again for his itchiness. I made no change in his medications. He is pain-free with no unusual shortness of breath within the limits of his activity.     His pacemaker still has 6 months to a year left in his battery, although we will

## 2021-10-14 NOTE — PROGRESS NOTES
[address] [place] [address]  Medication Management  ANTICOAGULATION    Referring Provider: Dr. Jenni Bell     GOAL INR: 2.0-3.0     TODAY'S INR: 2.5     WARFARIN Dosage:  2.5 mg  and Sundays and 5 mg daily all other days    INR (no units)   Date Value   10/14/2021 2.5   2021 3.6   2021 3.2   06/10/2021 2.5   2021 3   2021 2.5   2021 2.3       Medication changes:  Periactin 4 mg po bid, but hasn't started yet  Notes:    Fingerstick INR drawn per clinic protocol. Patient states no visible blood in urine and no black tarry stool. Denies any missed doses of warfarin. Fanny Zavaleta saw Dr. Kristine Perdomo on 10/12. Fanny Wintersouard was prescribed periactin 4 mg po bid for itching, but hasn't started yet. Fanny Zavaleta has increased greens consumption including asparagus. Since Diego's INR is therapeutic, we will continue current weekly warfarin regimen and will recheck INR in 7 weeks because of holiday. Patient acknowledges working in consult agreement with pharmacist as referred by his/her physician.                   For Pharmacy Admin Tracking Only     Intervention Detail: Adherence Monitorin   Total # of Interventions Recommended: 0   Total # of Interventions Accepted: 0   Time Spent (min): [address] Olivier, [facility], PharmD

## 2021-12-01 NOTE — TELEPHONE ENCOUNTER
COVID-19 phone screening     Call placed to screen patient prior to upcoming Medication Management visit for Anticoagulation on 12/2/21. Does patient have any of the following symptoms? [] Fever    [] Lower respiratory symptoms (SOB, difficulty breathing, cough)  [x] None    Travel Screening completed.
no

## 2021-12-02 NOTE — PROGRESS NOTES
[address]  Medication Management  ANTICOAGULATION    Referring Provider: Dr. Ni Bottom     GOAL INR: 2.0-3.0     TODAY'S INR: 3.2     WARFARIN Dosage:  2.5 mg tonight x 1 dose, then resume 2.5 mg  and Sundays and 5 mg daily all other days    INR (no units)   Date Value   2021 3.2   10/14/2021 2.5   2021 3.6   2021 3.2   06/10/2021 2.5   2021 3   2021 2.5       Medication changes:  None    Notes:    Fingerstick INR drawn per clinic protocol. Patient states no visible blood in urine and no black tarry stool. Denies any missed doses of warfarin. No change in other maintenance medications or in diet. Since his INR is slightly supra-therapeutic today, we will have him take 2.5 mg warfarin tonight (instead of 5 mg), but then resume current weekly warfarin regimen thereafter as noted on his dosing calendar. We will recheck INR in 6 weeks. Diego's wife, Kathleen Morrison, tells me that she is interested in pursuing at home self-testing of Diego's INR on a weekly basis at this time. She says she talked to Dr. Lennie You office staff about this and was told that he would be a candidate for self-testing and to start pursuing enrollment with [facility]. Patient acknowledges working in consult agreement with pharmacist as referred by his/her physician.                   For Pharmacy Admin Tracking Only     Intervention Detail: Adherence Monitorin and Dose Adjustment: 1, reason: Therapy Optimization   Total # of Interventions Recommended: 2   Total # of Interventions Accepted: 2   Time Spent (min): [address] Aw[address] Ochoa [facility], PharmD

## 2022-01-01 ENCOUNTER — HOSPITAL ENCOUNTER (INPATIENT)
Age: 82
LOS: 1 days | DRG: 951 | End: 2022-01-13
Attending: INTERNAL MEDICINE | Admitting: INTERNAL MEDICINE
Payer: COMMERCIAL

## 2022-01-01 ENCOUNTER — APPOINTMENT (OUTPATIENT)
Dept: GENERAL RADIOLOGY | Age: 82
DRG: 177 | End: 2022-01-01
Payer: MEDICARE

## 2022-01-01 ENCOUNTER — APPOINTMENT (OUTPATIENT)
Dept: PHARMACY | Age: 82
End: 2022-01-01
Payer: MEDICARE

## 2022-01-01 ENCOUNTER — HOSPITAL ENCOUNTER (INPATIENT)
Age: 82
LOS: 7 days | Discharge: HOSPICE | DRG: 177 | End: 2022-01-13
Attending: EMERGENCY MEDICINE | Admitting: INTERNAL MEDICINE
Payer: MEDICARE

## 2022-01-01 ENCOUNTER — HOSPITAL ENCOUNTER (OUTPATIENT)
Dept: PHARMACY | Age: 82
Setting detail: THERAPIES SERIES
Discharge: HOME | End: 2022-01-05
Payer: MEDICARE

## 2022-01-01 VITALS
HEART RATE: 90 BPM | RESPIRATION RATE: 21 BRPM | WEIGHT: 164.5 LBS | OXYGEN SATURATION: 93 % | BODY MASS INDEX: 24.93 KG/M2 | SYSTOLIC BLOOD PRESSURE: 106 MMHG | HEIGHT: 68 IN | DIASTOLIC BLOOD PRESSURE: 64 MMHG | TEMPERATURE: 98.2 F

## 2022-01-01 VITALS
RESPIRATION RATE: 22 BRPM | WEIGHT: 164.5 LBS | TEMPERATURE: 98 F | DIASTOLIC BLOOD PRESSURE: 58 MMHG | HEIGHT: 68 IN | SYSTOLIC BLOOD PRESSURE: 96 MMHG | OXYGEN SATURATION: 93 % | HEART RATE: 90 BPM | BODY MASS INDEX: 24.93 KG/M2

## 2022-01-01 DIAGNOSIS — R09.02 HYPOXIA: ICD-10-CM

## 2022-01-01 DIAGNOSIS — U07.1 PNEUMONIA DUE TO COVID-19 VIRUS: ICD-10-CM

## 2022-01-01 DIAGNOSIS — I63.512 ACUTE ISCHEMIC LEFT MCA STROKE (HCC): Primary | ICD-10-CM

## 2022-01-01 DIAGNOSIS — J12.82 PNEUMONIA DUE TO COVID-19 VIRUS: ICD-10-CM

## 2022-01-01 DIAGNOSIS — R06.02 SHORTNESS OF BREATH: Primary | ICD-10-CM

## 2022-01-01 LAB
-: ABNORMAL
ABSOLUTE EOS #: 0 K/UL (ref 0–0.4)
ABSOLUTE EOS #: NORMAL K/UL (ref 0–0.4)
ABSOLUTE IMMATURE GRANULOCYTE: ABNORMAL K/UL (ref 0–0.3)
ABSOLUTE IMMATURE GRANULOCYTE: NORMAL K/UL (ref 0–0.3)
ABSOLUTE LYMPH #: 0.4 K/UL (ref 1–4.8)
ABSOLUTE LYMPH #: NORMAL K/UL (ref 1–4.8)
ABSOLUTE MONO #: 0.5 K/UL (ref 0–1)
ABSOLUTE MONO #: NORMAL K/UL (ref 0–1)
ALBUMIN SERPL-MCNC: 3 G/DL (ref 3.5–5.2)
ALBUMIN SERPL-MCNC: 3 G/DL (ref 3.5–5.2)
ALBUMIN SERPL-MCNC: 3.3 G/DL (ref 3.5–5.2)
ALBUMIN/GLOBULIN RATIO: ABNORMAL (ref 1–2.5)
ALP BLD-CCNC: 39 U/L (ref 40–129)
ALP BLD-CCNC: 44 U/L (ref 40–129)
ALP BLD-CCNC: 46 U/L (ref 40–129)
ALT SERPL-CCNC: 22 U/L (ref 5–41)
ALT SERPL-CCNC: 24 U/L (ref 5–41)
ALT SERPL-CCNC: 25 U/L (ref 5–41)
AMORPHOUS: ABNORMAL
ANION GAP SERPL CALCULATED.3IONS-SCNC: 10 MMOL/L (ref 9–17)
ANION GAP SERPL CALCULATED.3IONS-SCNC: 11 MMOL/L (ref 9–17)
ANION GAP SERPL CALCULATED.3IONS-SCNC: 12 MMOL/L (ref 9–17)
ANION GAP SERPL CALCULATED.3IONS-SCNC: 13 MMOL/L (ref 9–17)
ANION GAP SERPL CALCULATED.3IONS-SCNC: 13 MMOL/L (ref 9–17)
AST SERPL-CCNC: 41 U/L
AST SERPL-CCNC: 49 U/L
AST SERPL-CCNC: 57 U/L
BACTERIA: ABNORMAL
BASOPHILS # BLD: 0 % (ref 0–2)
BASOPHILS # BLD: NORMAL % (ref 0–2)
BASOPHILS ABSOLUTE: 0 K/UL (ref 0–0.2)
BASOPHILS ABSOLUTE: NORMAL K/UL (ref 0–0.2)
BILIRUB SERPL-MCNC: 0.57 MG/DL (ref 0.3–1.2)
BILIRUB SERPL-MCNC: 0.67 MG/DL (ref 0.3–1.2)
BILIRUB SERPL-MCNC: 0.79 MG/DL (ref 0.3–1.2)
BILIRUBIN URINE: NEGATIVE
BUN BLDV-MCNC: 23 MG/DL (ref 8–23)
BUN BLDV-MCNC: 25 MG/DL (ref 8–23)
BUN BLDV-MCNC: 35 MG/DL (ref 8–23)
BUN BLDV-MCNC: 38 MG/DL (ref 8–23)
BUN BLDV-MCNC: 39 MG/DL (ref 8–23)
BUN/CREAT BLD: 21 (ref 9–20)
BUN/CREAT BLD: 23 (ref 9–20)
BUN/CREAT BLD: 33 (ref 9–20)
BUN/CREAT BLD: 36 (ref 9–20)
BUN/CREAT BLD: 36 (ref 9–20)
C-REACTIVE PROTEIN: 131.8 MG/L (ref 0–5)
C-REACTIVE PROTEIN: 132.8 MG/L (ref 0–5)
C-REACTIVE PROTEIN: 15.3 MG/L (ref 0–5)
C-REACTIVE PROTEIN: 32.2 MG/L (ref 0–5)
C-REACTIVE PROTEIN: 6.9 MG/L (ref 0–5)
C-REACTIVE PROTEIN: 74.1 MG/L (ref 0–5)
C-REACTIVE PROTEIN: 9.2 MG/L (ref 0–5)
CALCIUM SERPL-MCNC: 9.2 MG/DL (ref 8.6–10.4)
CALCIUM SERPL-MCNC: 9.3 MG/DL (ref 8.6–10.4)
CALCIUM SERPL-MCNC: 9.4 MG/DL (ref 8.6–10.4)
CALCIUM SERPL-MCNC: 9.5 MG/DL (ref 8.6–10.4)
CALCIUM SERPL-MCNC: 9.6 MG/DL (ref 8.6–10.4)
CASTS UA: ABNORMAL /LPF
CHLORIDE BLD-SCNC: 102 MMOL/L (ref 98–107)
CHLORIDE BLD-SCNC: 104 MMOL/L (ref 98–107)
CHLORIDE BLD-SCNC: 107 MMOL/L (ref 98–107)
CHLORIDE BLD-SCNC: 109 MMOL/L (ref 98–107)
CHLORIDE BLD-SCNC: 110 MMOL/L (ref 98–107)
CO2: 20 MMOL/L (ref 20–31)
CO2: 23 MMOL/L (ref 20–31)
CO2: 24 MMOL/L (ref 20–31)
CO2: 25 MMOL/L (ref 20–31)
CO2: 26 MMOL/L (ref 20–31)
COLOR: ABNORMAL
COMMENT UA: ABNORMAL
CREAT SERPL-MCNC: 1.05 MG/DL (ref 0.7–1.2)
CREAT SERPL-MCNC: 1.06 MG/DL (ref 0.7–1.2)
CREAT SERPL-MCNC: 1.07 MG/DL (ref 0.7–1.2)
CREAT SERPL-MCNC: 1.08 MG/DL (ref 0.7–1.2)
CREAT SERPL-MCNC: 1.11 MG/DL (ref 0.7–1.2)
CRYSTALS, UA: ABNORMAL /HPF
D-DIMER QUANTITATIVE: 0.34 MG/L FEU (ref 0–0.59)
D-DIMER QUANTITATIVE: 0.38 MG/L FEU (ref 0–0.59)
D-DIMER QUANTITATIVE: 0.49 MG/L FEU (ref 0–0.59)
D-DIMER QUANTITATIVE: 2.06 MG/L FEU (ref 0–0.59)
D-DIMER QUANTITATIVE: >20 MG/L FEU (ref 0–0.59)
D-DIMER QUANTITATIVE: >20 MG/L FEU (ref 0–0.59)
DIFFERENTIAL TYPE: ABNORMAL
DIFFERENTIAL TYPE: NORMAL
EKG ATRIAL RATE: 127 BPM
EKG ATRIAL RATE: 73 BPM
EKG ATRIAL RATE: 98 BPM
EKG P AXIS: 10 DEGREES
EKG P-R INTERVAL: 174 MS
EKG Q-T INTERVAL: 338 MS
EKG Q-T INTERVAL: 370 MS
EKG Q-T INTERVAL: 442 MS
EKG QRS DURATION: 138 MS
EKG QRS DURATION: 140 MS
EKG QRS DURATION: 146 MS
EKG QTC CALCULATION (BAZETT): 486 MS
EKG QTC CALCULATION (BAZETT): 493 MS
EKG QTC CALCULATION (BAZETT): 542 MS
EKG R AXIS: -6 DEGREES
EKG R AXIS: -7 DEGREES
EKG R AXIS: 8 DEGREES
EKG T AXIS: -22 DEGREES
EKG T AXIS: -27 DEGREES
EKG T AXIS: -27 DEGREES
EKG VENTRICULAR RATE: 128 BPM
EKG VENTRICULAR RATE: 129 BPM
EKG VENTRICULAR RATE: 73 BPM
EOSINOPHILS RELATIVE PERCENT: 0 % (ref 0–5)
EOSINOPHILS RELATIVE PERCENT: NORMAL % (ref 0–5)
EPITHELIAL CELLS UA: ABNORMAL /HPF
FERRITIN: 1006 UG/L (ref 30–400)
FERRITIN: 792 UG/L (ref 30–400)
FERRITIN: 793 UG/L (ref 30–400)
FERRITIN: 877 UG/L (ref 30–400)
FERRITIN: 897 UG/L (ref 30–400)
FERRITIN: 920 UG/L (ref 30–400)
GFR AFRICAN AMERICAN: >60 ML/MIN
GFR NON-AFRICAN AMERICAN: >60 ML/MIN
GFR SERPL CREATININE-BSD FRML MDRD: ABNORMAL ML/MIN/{1.73_M2}
GLUCOSE BLD-MCNC: 151 MG/DL (ref 70–99)
GLUCOSE BLD-MCNC: 156 MG/DL (ref 70–99)
GLUCOSE BLD-MCNC: 158 MG/DL (ref 70–99)
GLUCOSE BLD-MCNC: 170 MG/DL (ref 70–99)
GLUCOSE BLD-MCNC: 174 MG/DL (ref 70–99)
GLUCOSE URINE: ABNORMAL
HCT VFR BLD CALC: 42.7 % (ref 41–53)
HCT VFR BLD CALC: 44.2 % (ref 41–53)
HEMOGLOBIN: 14.4 G/DL (ref 13.5–17.5)
HEMOGLOBIN: 14.5 G/DL (ref 13.5–17.5)
IMMATURE GRANULOCYTES: ABNORMAL %
IMMATURE GRANULOCYTES: NORMAL %
INR BLD: 1.8
INR BLD: 2.7
INR BLD: 3.9
INR BLD: 4
INR BLD: 4
INR BLD: 4.2
INR BLD: 4.3
INR BLD: 5.3
INR BLD: 5.5
KETONES, URINE: NEGATIVE
LEUKOCYTE ESTERASE, URINE: ABNORMAL
LYMPHOCYTES # BLD: 8 % (ref 13–44)
LYMPHOCYTES # BLD: NORMAL % (ref 13–44)
MCH RBC QN AUTO: 28.7 PG (ref 26–34)
MCH RBC QN AUTO: 29.6 PG (ref 26–34)
MCHC RBC AUTO-ENTMCNC: 32.8 G/DL (ref 31–37)
MCHC RBC AUTO-ENTMCNC: 33.8 G/DL (ref 31–37)
MCV RBC AUTO: 87.5 FL (ref 80–100)
MCV RBC AUTO: 87.5 FL (ref 80–100)
MONOCYTES # BLD: 10 % (ref 5–9)
MONOCYTES # BLD: NORMAL % (ref 5–9)
MORPHOLOGY: NORMAL
MUCUS: ABNORMAL
NITRITE, URINE: NEGATIVE
NRBC AUTOMATED: ABNORMAL PER 100 WBC
NRBC AUTOMATED: NORMAL PER 100 WBC
OTHER OBSERVATIONS UA: ABNORMAL
PDW BLD-RTO: 14.4 % (ref 12.1–15.2)
PDW BLD-RTO: 14.6 % (ref 12.1–15.2)
PH UA: 6 (ref 5–8)
PLATELET # BLD: 113 K/UL (ref 140–450)
PLATELET # BLD: 190 K/UL (ref 140–450)
PLATELET ESTIMATE: ABNORMAL
PLATELET ESTIMATE: NORMAL
PMV BLD AUTO: ABNORMAL FL (ref 6–12)
PMV BLD AUTO: NORMAL FL (ref 6–12)
POTASSIUM SERPL-SCNC: 3.9 MMOL/L (ref 3.7–5.3)
POTASSIUM SERPL-SCNC: 4.3 MMOL/L (ref 3.7–5.3)
POTASSIUM SERPL-SCNC: 4.4 MMOL/L (ref 3.7–5.3)
POTASSIUM SERPL-SCNC: 4.5 MMOL/L (ref 3.7–5.3)
POTASSIUM SERPL-SCNC: 4.5 MMOL/L (ref 3.7–5.3)
PROTEIN UA: ABNORMAL
PROTHROMBIN TIME: 20.2 SEC (ref 11.5–14.2)
PROTHROMBIN TIME: 27.2 SEC (ref 11.5–14.2)
PROTHROMBIN TIME: 36.2 SEC (ref 11.5–14.2)
PROTHROMBIN TIME: 37.4 SEC (ref 11.5–14.2)
PROTHROMBIN TIME: 38.5 SEC (ref 11.5–14.2)
PROTHROMBIN TIME: 39.2 SEC (ref 11.5–14.2)
PROTHROMBIN TIME: 46 SEC (ref 11.5–14.2)
PROTHROMBIN TIME: 47.7 SEC (ref 11.5–14.2)
RBC # BLD: 4.88 M/UL (ref 4.5–5.9)
RBC # BLD: 5.05 M/UL (ref 4.5–5.9)
RBC # BLD: ABNORMAL 10*6/UL
RBC # BLD: NORMAL 10*6/UL
RBC UA: ABNORMAL /HPF (ref 0–2)
RENAL EPITHELIAL, UA: ABNORMAL /HPF
SARS-COV-2, RAPID: DETECTED
SEG NEUTROPHILS: 82 % (ref 39–75)
SEG NEUTROPHILS: NORMAL % (ref 39–75)
SEGMENTED NEUTROPHILS ABSOLUTE COUNT: 4.2 K/UL (ref 2.1–6.5)
SEGMENTED NEUTROPHILS ABSOLUTE COUNT: NORMAL K/UL (ref 2.1–6.5)
SODIUM BLD-SCNC: 140 MMOL/L (ref 135–144)
SODIUM BLD-SCNC: 140 MMOL/L (ref 135–144)
SODIUM BLD-SCNC: 141 MMOL/L (ref 135–144)
SODIUM BLD-SCNC: 142 MMOL/L (ref 135–144)
SODIUM BLD-SCNC: 146 MMOL/L (ref 135–144)
SPECIFIC GRAVITY UA: 1.02 (ref 1–1.03)
SPECIMEN DESCRIPTION: ABNORMAL
TOTAL PROTEIN: 6.7 G/DL (ref 6.4–8.3)
TOTAL PROTEIN: 6.8 G/DL (ref 6.4–8.3)
TOTAL PROTEIN: 7.2 G/DL (ref 6.4–8.3)
TRICHOMONAS: ABNORMAL
TURBIDITY: ABNORMAL
URINE HGB: ABNORMAL
UROBILINOGEN, URINE: NORMAL
WBC # BLD: 5.1 K/UL (ref 3.5–11)
WBC # BLD: 9.9 K/UL (ref 3.5–11)
WBC # BLD: ABNORMAL 10*3/UL
WBC # BLD: NORMAL 10*3/UL
WBC UA: ABNORMAL /HPF
YEAST: ABNORMAL

## 2022-01-01 PROCEDURE — 85610 PROTHROMBIN TIME: CPT

## 2022-01-01 PROCEDURE — 99212 OFFICE O/P EST SF 10 MIN: CPT

## 2022-01-01 PROCEDURE — 2500000003 HC RX 250 WO HCPCS: Performed by: INTERNAL MEDICINE

## 2022-01-01 PROCEDURE — 80048 BASIC METABOLIC PNL TOTAL CA: CPT

## 2022-01-01 PROCEDURE — 93010 ELECTROCARDIOGRAM REPORT: CPT | Performed by: INTERNAL MEDICINE

## 2022-01-01 PROCEDURE — 86140 C-REACTIVE PROTEIN: CPT

## 2022-01-01 PROCEDURE — 93005 ELECTROCARDIOGRAM TRACING: CPT | Performed by: INTERNAL MEDICINE

## 2022-01-01 PROCEDURE — 6370000000 HC RX 637 (ALT 250 FOR IP): Performed by: INTERNAL MEDICINE

## 2022-01-01 PROCEDURE — 71045 X-RAY EXAM CHEST 1 VIEW: CPT

## 2022-01-01 PROCEDURE — 6360000002 HC RX W HCPCS: Performed by: EMERGENCY MEDICINE

## 2022-01-01 PROCEDURE — 36415 COLL VENOUS BLD VENIPUNCTURE: CPT

## 2022-01-01 PROCEDURE — 2700000000 HC OXYGEN THERAPY PER DAY

## 2022-01-01 PROCEDURE — 94667 MNPJ CHEST WALL 1ST: CPT

## 2022-01-01 PROCEDURE — 93005 ELECTROCARDIOGRAM TRACING: CPT | Performed by: EMERGENCY MEDICINE

## 2022-01-01 PROCEDURE — 6360000002 HC RX W HCPCS: Performed by: INTERNAL MEDICINE

## 2022-01-01 PROCEDURE — 94761 N-INVAS EAR/PLS OXIMETRY MLT: CPT

## 2022-01-01 PROCEDURE — 82728 ASSAY OF FERRITIN: CPT

## 2022-01-01 PROCEDURE — 87635 SARS-COV-2 COVID-19 AMP PRB: CPT

## 2022-01-01 PROCEDURE — 2580000003 HC RX 258: Performed by: INTERNAL MEDICINE

## 2022-01-01 PROCEDURE — 99222 1ST HOSP IP/OBS MODERATE 55: CPT | Performed by: INTERNAL MEDICINE

## 2022-01-01 PROCEDURE — 99232 SBSQ HOSP IP/OBS MODERATE 35: CPT | Performed by: INTERNAL MEDICINE

## 2022-01-01 PROCEDURE — 85379 FIBRIN DEGRADATION QUANT: CPT

## 2022-01-01 PROCEDURE — 94640 AIRWAY INHALATION TREATMENT: CPT

## 2022-01-01 PROCEDURE — 94669 MECHANICAL CHEST WALL OSCILL: CPT

## 2022-01-01 PROCEDURE — 96374 THER/PROPH/DIAG INJ IV PUSH: CPT

## 2022-01-01 PROCEDURE — 85025 COMPLETE CBC W/AUTO DIFF WBC: CPT

## 2022-01-01 PROCEDURE — 1200000000 HC SEMI PRIVATE

## 2022-01-01 PROCEDURE — 1250000000 HC SEMI PRIVATE HOSPICE R&B

## 2022-01-01 PROCEDURE — 80053 COMPREHEN METABOLIC PANEL: CPT

## 2022-01-01 PROCEDURE — C9803 HOPD COVID-19 SPEC COLLECT: HCPCS

## 2022-01-01 PROCEDURE — 94660 CPAP INITIATION&MGMT: CPT

## 2022-01-01 PROCEDURE — 6370000000 HC RX 637 (ALT 250 FOR IP): Performed by: EMERGENCY MEDICINE

## 2022-01-01 PROCEDURE — 81001 URINALYSIS AUTO W/SCOPE: CPT

## 2022-01-01 PROCEDURE — 99284 EMERGENCY DEPT VISIT MOD MDM: CPT

## 2022-01-01 PROCEDURE — 94664 DEMO&/EVAL PT USE INHALER: CPT

## 2022-01-01 RX ORDER — DEXAMETHASONE SODIUM PHOSPHATE 10 MG/ML
10 INJECTION, SOLUTION INTRAMUSCULAR; INTRAVENOUS EVERY 24 HOURS
Status: DISCONTINUED | OUTPATIENT
Start: 2022-01-01 | End: 2022-01-01

## 2022-01-01 RX ORDER — ONDANSETRON 4 MG/1
4 TABLET, ORALLY DISINTEGRATING ORAL EVERY 8 HOURS PRN
Status: DISCONTINUED | OUTPATIENT
Start: 2022-01-01 | End: 2022-01-14 | Stop reason: HOSPADM

## 2022-01-01 RX ORDER — HYDRALAZINE HYDROCHLORIDE 20 MG/ML
10 INJECTION INTRAMUSCULAR; INTRAVENOUS EVERY 6 HOURS PRN
Status: DISCONTINUED | OUTPATIENT
Start: 2022-01-01 | End: 2022-01-14 | Stop reason: HOSPADM

## 2022-01-01 RX ORDER — SODIUM CHLORIDE 0.9 % (FLUSH) 0.9 %
5-40 SYRINGE (ML) INJECTION PRN
Status: DISCONTINUED | OUTPATIENT
Start: 2022-01-01 | End: 2022-01-01 | Stop reason: HOSPADM

## 2022-01-01 RX ORDER — ONDANSETRON 2 MG/ML
4 INJECTION INTRAMUSCULAR; INTRAVENOUS EVERY 6 HOURS PRN
Status: DISCONTINUED | OUTPATIENT
Start: 2022-01-01 | End: 2022-01-14 | Stop reason: HOSPADM

## 2022-01-01 RX ORDER — ASCORBIC ACID 500 MG
1000 TABLET ORAL DAILY
Status: DISCONTINUED | OUTPATIENT
Start: 2022-01-01 | End: 2022-01-01 | Stop reason: HOSPADM

## 2022-01-01 RX ORDER — MORPHINE SULFATE 2 MG/ML
2 INJECTION, SOLUTION INTRAMUSCULAR; INTRAVENOUS EVERY 4 HOURS PRN
Status: DISCONTINUED | OUTPATIENT
Start: 2022-01-01 | End: 2022-01-01

## 2022-01-01 RX ORDER — SODIUM CHLORIDE 9 MG/ML
25 INJECTION, SOLUTION INTRAVENOUS PRN
Status: DISCONTINUED | OUTPATIENT
Start: 2022-01-01 | End: 2022-01-14 | Stop reason: HOSPADM

## 2022-01-01 RX ORDER — ACETAMINOPHEN 325 MG/1
650 TABLET ORAL EVERY 6 HOURS PRN
Status: DISCONTINUED | OUTPATIENT
Start: 2022-01-01 | End: 2022-01-14 | Stop reason: HOSPADM

## 2022-01-01 RX ORDER — ACETAMINOPHEN 650 MG/1
650 SUPPOSITORY RECTAL EVERY 6 HOURS PRN
Status: DISCONTINUED | OUTPATIENT
Start: 2022-01-01 | End: 2022-01-14 | Stop reason: HOSPADM

## 2022-01-01 RX ORDER — HYDRALAZINE HYDROCHLORIDE 20 MG/ML
10 INJECTION INTRAMUSCULAR; INTRAVENOUS EVERY 6 HOURS PRN
Status: DISCONTINUED | OUTPATIENT
Start: 2022-01-01 | End: 2022-01-01 | Stop reason: HOSPADM

## 2022-01-01 RX ORDER — ACETAMINOPHEN 650 MG/1
650 SUPPOSITORY RECTAL EVERY 6 HOURS PRN
Status: CANCELLED | OUTPATIENT
Start: 2022-01-01

## 2022-01-01 RX ORDER — POLYETHYLENE GLYCOL 3350 17 G/17G
17 POWDER, FOR SOLUTION ORAL DAILY PRN
Status: DISCONTINUED | OUTPATIENT
Start: 2022-01-01 | End: 2022-01-01 | Stop reason: HOSPADM

## 2022-01-01 RX ORDER — MORPHINE SULFATE 2 MG/ML
2 INJECTION, SOLUTION INTRAMUSCULAR; INTRAVENOUS
Status: DISCONTINUED | OUTPATIENT
Start: 2022-01-01 | End: 2022-01-01 | Stop reason: HOSPADM

## 2022-01-01 RX ORDER — GUAIFENESIN 600 MG/1
600 TABLET, EXTENDED RELEASE ORAL 2 TIMES DAILY
Status: CANCELLED | OUTPATIENT
Start: 2022-01-01

## 2022-01-01 RX ORDER — SODIUM CHLORIDE 9 MG/ML
25 INJECTION, SOLUTION INTRAVENOUS PRN
Status: DISCONTINUED | OUTPATIENT
Start: 2022-01-01 | End: 2022-01-01 | Stop reason: HOSPADM

## 2022-01-01 RX ORDER — HALOPERIDOL 5 MG/ML
5 INJECTION INTRAMUSCULAR EVERY 6 HOURS PRN
Status: CANCELLED | OUTPATIENT
Start: 2022-01-01

## 2022-01-01 RX ORDER — ACETAMINOPHEN 325 MG/1
650 TABLET ORAL EVERY 6 HOURS PRN
Status: DISCONTINUED | OUTPATIENT
Start: 2022-01-01 | End: 2022-01-01 | Stop reason: HOSPADM

## 2022-01-01 RX ORDER — DEXAMETHASONE SODIUM PHOSPHATE 10 MG/ML
INJECTION, SOLUTION INTRAMUSCULAR; INTRAVENOUS
Status: DISPENSED
Start: 2022-01-01 | End: 2022-01-01

## 2022-01-01 RX ORDER — IPRATROPIUM BROMIDE AND ALBUTEROL SULFATE 2.5; .5 MG/3ML; MG/3ML
1 SOLUTION RESPIRATORY (INHALATION) ONCE
Status: COMPLETED | OUTPATIENT
Start: 2022-01-01 | End: 2022-01-01

## 2022-01-01 RX ORDER — SODIUM CHLORIDE 0.9 % (FLUSH) 0.9 %
5-40 SYRINGE (ML) INJECTION EVERY 12 HOURS SCHEDULED
Status: CANCELLED | OUTPATIENT
Start: 2022-01-01

## 2022-01-01 RX ORDER — SODIUM CHLORIDE 9 MG/ML
25 INJECTION, SOLUTION INTRAVENOUS PRN
Status: CANCELLED | OUTPATIENT
Start: 2022-01-01

## 2022-01-01 RX ORDER — MORPHINE SULFATE 2 MG/ML
2 INJECTION, SOLUTION INTRAMUSCULAR; INTRAVENOUS
Status: CANCELLED | OUTPATIENT
Start: 2022-01-01

## 2022-01-01 RX ORDER — VITAMIN B COMPLEX
2000 TABLET ORAL DAILY
Status: DISCONTINUED | OUTPATIENT
Start: 2022-01-01 | End: 2022-01-01 | Stop reason: HOSPADM

## 2022-01-01 RX ORDER — METOPROLOL TARTRATE 5 MG/5ML
5 INJECTION INTRAVENOUS ONCE
Status: COMPLETED | OUTPATIENT
Start: 2022-01-01 | End: 2022-01-01

## 2022-01-01 RX ORDER — SODIUM CHLORIDE 0.9 % (FLUSH) 0.9 %
5-40 SYRINGE (ML) INJECTION EVERY 12 HOURS SCHEDULED
Status: DISCONTINUED | OUTPATIENT
Start: 2022-01-01 | End: 2022-01-14 | Stop reason: HOSPADM

## 2022-01-01 RX ORDER — ASCORBIC ACID 500 MG
1000 TABLET ORAL DAILY
Status: CANCELLED | OUTPATIENT
Start: 2022-01-01

## 2022-01-01 RX ORDER — ONDANSETRON 4 MG/1
4 TABLET, ORALLY DISINTEGRATING ORAL EVERY 8 HOURS PRN
Status: CANCELLED | OUTPATIENT
Start: 2022-01-01

## 2022-01-01 RX ORDER — WARFARIN SODIUM 5 MG/1
5 TABLET ORAL
Status: DISCONTINUED | OUTPATIENT
Start: 2022-01-01 | End: 2022-01-01 | Stop reason: DRUGHIGH

## 2022-01-01 RX ORDER — GUAIFENESIN 600 MG/1
600 TABLET, EXTENDED RELEASE ORAL 2 TIMES DAILY
Status: DISCONTINUED | OUTPATIENT
Start: 2022-01-01 | End: 2022-01-01 | Stop reason: HOSPADM

## 2022-01-01 RX ORDER — SODIUM CHLORIDE 450 MG/100ML
INJECTION, SOLUTION INTRAVENOUS CONTINUOUS
Status: DISCONTINUED | OUTPATIENT
Start: 2022-01-01 | End: 2022-01-01

## 2022-01-01 RX ORDER — METOPROLOL TARTRATE 5 MG/5ML
5 INJECTION INTRAVENOUS EVERY 6 HOURS PRN
Status: DISCONTINUED | OUTPATIENT
Start: 2022-01-01 | End: 2022-01-14 | Stop reason: HOSPADM

## 2022-01-01 RX ORDER — WARFARIN SODIUM 2.5 MG/1
2.5 TABLET ORAL
Status: COMPLETED | OUTPATIENT
Start: 2022-01-01 | End: 2022-01-01

## 2022-01-01 RX ORDER — GUAIFENESIN 600 MG/1
600 TABLET, EXTENDED RELEASE ORAL 2 TIMES DAILY
Status: DISCONTINUED | OUTPATIENT
Start: 2022-01-01 | End: 2022-01-14 | Stop reason: HOSPADM

## 2022-01-01 RX ORDER — METOPROLOL TARTRATE 5 MG/5ML
2.5 INJECTION INTRAVENOUS EVERY 6 HOURS PRN
Status: DISCONTINUED | OUTPATIENT
Start: 2022-01-01 | End: 2022-01-01

## 2022-01-01 RX ORDER — HYDRALAZINE HYDROCHLORIDE 20 MG/ML
10 INJECTION INTRAMUSCULAR; INTRAVENOUS EVERY 6 HOURS PRN
Status: CANCELLED | OUTPATIENT
Start: 2022-01-01

## 2022-01-01 RX ORDER — WARFARIN SODIUM 2.5 MG/1
2.5 TABLET ORAL
Status: DISCONTINUED | OUTPATIENT
Start: 2022-01-01 | End: 2022-01-01 | Stop reason: DRUGHIGH

## 2022-01-01 RX ORDER — METOPROLOL TARTRATE 5 MG/5ML
5 INJECTION INTRAVENOUS EVERY 6 HOURS PRN
Status: CANCELLED | OUTPATIENT
Start: 2022-01-01

## 2022-01-01 RX ORDER — HALOPERIDOL 5 MG/ML
5 INJECTION INTRAMUSCULAR EVERY 6 HOURS PRN
Status: DISCONTINUED | OUTPATIENT
Start: 2022-01-01 | End: 2022-01-01 | Stop reason: HOSPADM

## 2022-01-01 RX ORDER — HALOPERIDOL 5 MG/ML
5 INJECTION INTRAMUSCULAR EVERY 6 HOURS PRN
Status: DISCONTINUED | OUTPATIENT
Start: 2022-01-01 | End: 2022-01-14 | Stop reason: HOSPADM

## 2022-01-01 RX ORDER — SODIUM CHLORIDE 0.9 % (FLUSH) 0.9 %
5-40 SYRINGE (ML) INJECTION PRN
Status: CANCELLED | OUTPATIENT
Start: 2022-01-01

## 2022-01-01 RX ORDER — ACETAMINOPHEN 325 MG/1
650 TABLET ORAL EVERY 6 HOURS PRN
Status: CANCELLED | OUTPATIENT
Start: 2022-01-01

## 2022-01-01 RX ORDER — ONDANSETRON 4 MG/1
4 TABLET, ORALLY DISINTEGRATING ORAL EVERY 8 HOURS PRN
Status: DISCONTINUED | OUTPATIENT
Start: 2022-01-01 | End: 2022-01-01 | Stop reason: HOSPADM

## 2022-01-01 RX ORDER — SODIUM CHLORIDE 0.9 % (FLUSH) 0.9 %
5-40 SYRINGE (ML) INJECTION EVERY 12 HOURS SCHEDULED
Status: DISCONTINUED | OUTPATIENT
Start: 2022-01-01 | End: 2022-01-01 | Stop reason: HOSPADM

## 2022-01-01 RX ORDER — PHYTONADIONE 10 MG/ML
2 INJECTION, EMULSION INTRAMUSCULAR; INTRAVENOUS; SUBCUTANEOUS ONCE
Status: COMPLETED | OUTPATIENT
Start: 2022-01-01 | End: 2022-01-01

## 2022-01-01 RX ORDER — MORPHINE SULFATE 2 MG/ML
2 INJECTION, SOLUTION INTRAMUSCULAR; INTRAVENOUS
Status: DISCONTINUED | OUTPATIENT
Start: 2022-01-01 | End: 2022-01-14 | Stop reason: HOSPADM

## 2022-01-01 RX ORDER — ONDANSETRON 2 MG/ML
4 INJECTION INTRAMUSCULAR; INTRAVENOUS EVERY 6 HOURS PRN
Status: DISCONTINUED | OUTPATIENT
Start: 2022-01-01 | End: 2022-01-01 | Stop reason: HOSPADM

## 2022-01-01 RX ORDER — ONDANSETRON 2 MG/ML
4 INJECTION INTRAMUSCULAR; INTRAVENOUS EVERY 6 HOURS PRN
Status: CANCELLED | OUTPATIENT
Start: 2022-01-01

## 2022-01-01 RX ORDER — ASCORBIC ACID 500 MG
1000 TABLET ORAL DAILY
Status: DISCONTINUED | OUTPATIENT
Start: 2022-01-14 | End: 2022-01-14 | Stop reason: HOSPADM

## 2022-01-01 RX ORDER — CYPROHEPTADINE HYDROCHLORIDE 4 MG/1
4 TABLET ORAL 2 TIMES DAILY
Status: DISCONTINUED | OUTPATIENT
Start: 2022-01-01 | End: 2022-01-01 | Stop reason: HOSPADM

## 2022-01-01 RX ORDER — SODIUM CHLORIDE 0.9 % (FLUSH) 0.9 %
5-40 SYRINGE (ML) INJECTION PRN
Status: DISCONTINUED | OUTPATIENT
Start: 2022-01-01 | End: 2022-01-14 | Stop reason: HOSPADM

## 2022-01-01 RX ORDER — LORAZEPAM 2 MG/ML
0.5 INJECTION INTRAMUSCULAR EVERY 6 HOURS PRN
Status: CANCELLED | OUTPATIENT
Start: 2022-01-01

## 2022-01-01 RX ORDER — ACETAMINOPHEN 650 MG/1
650 SUPPOSITORY RECTAL EVERY 6 HOURS PRN
Status: DISCONTINUED | OUTPATIENT
Start: 2022-01-01 | End: 2022-01-01 | Stop reason: HOSPADM

## 2022-01-01 RX ORDER — DEXAMETHASONE SODIUM PHOSPHATE 10 MG/ML
10 INJECTION, SOLUTION INTRAMUSCULAR; INTRAVENOUS ONCE
Status: COMPLETED | OUTPATIENT
Start: 2022-01-01 | End: 2022-01-01

## 2022-01-01 RX ORDER — PANTOPRAZOLE SODIUM 40 MG/1
40 TABLET, DELAYED RELEASE ORAL
Status: DISCONTINUED | OUTPATIENT
Start: 2022-01-01 | End: 2022-01-01 | Stop reason: HOSPADM

## 2022-01-01 RX ORDER — LORAZEPAM 2 MG/ML
0.5 INJECTION INTRAMUSCULAR EVERY 6 HOURS PRN
Status: DISCONTINUED | OUTPATIENT
Start: 2022-01-01 | End: 2022-01-01 | Stop reason: HOSPADM

## 2022-01-01 RX ORDER — DEXAMETHASONE SODIUM PHOSPHATE 10 MG/ML
6 INJECTION, SOLUTION INTRAMUSCULAR; INTRAVENOUS EVERY 24 HOURS
Status: DISCONTINUED | OUTPATIENT
Start: 2022-01-01 | End: 2022-01-01 | Stop reason: HOSPADM

## 2022-01-01 RX ORDER — ZINC SULFATE 50(220)MG
50 CAPSULE ORAL DAILY
Status: DISCONTINUED | OUTPATIENT
Start: 2022-01-01 | End: 2022-01-01 | Stop reason: HOSPADM

## 2022-01-01 RX ORDER — LORAZEPAM 2 MG/ML
0.5 INJECTION INTRAMUSCULAR EVERY 6 HOURS PRN
Status: DISCONTINUED | OUTPATIENT
Start: 2022-01-01 | End: 2022-01-14 | Stop reason: HOSPADM

## 2022-01-01 RX ORDER — METOPROLOL TARTRATE 5 MG/5ML
5 INJECTION INTRAVENOUS EVERY 6 HOURS PRN
Status: DISCONTINUED | OUTPATIENT
Start: 2022-01-01 | End: 2022-01-01 | Stop reason: HOSPADM

## 2022-01-01 RX ADMIN — PANTOPRAZOLE SODIUM 40 MG: 40 TABLET, DELAYED RELEASE ORAL at 06:30

## 2022-01-01 RX ADMIN — CYPROHEPTADINE HYDROCHLORIDE 4 MG: 4 TABLET ORAL at 12:13

## 2022-01-01 RX ADMIN — IPRATROPIUM BROMIDE AND ALBUTEROL SULFATE 1 AMPULE: .5; 3 SOLUTION RESPIRATORY (INHALATION) at 20:35

## 2022-01-01 RX ADMIN — MORPHINE SULFATE 2 MG: 2 INJECTION, SOLUTION INTRAMUSCULAR; INTRAVENOUS at 06:24

## 2022-01-01 RX ADMIN — MORPHINE SULFATE 2 MG: 2 INJECTION, SOLUTION INTRAMUSCULAR; INTRAVENOUS at 11:42

## 2022-01-01 RX ADMIN — SODIUM CHLORIDE, PRESERVATIVE FREE 10 ML: 5 INJECTION INTRAVENOUS at 10:47

## 2022-01-01 RX ADMIN — CYPROHEPTADINE HYDROCHLORIDE 4 MG: 4 TABLET ORAL at 18:35

## 2022-01-01 RX ADMIN — ZINC SULFATE 220 MG (50 MG) CAPSULE 50 MG: CAPSULE at 08:07

## 2022-01-01 RX ADMIN — SODIUM CHLORIDE, PRESERVATIVE FREE 10 ML: 5 INJECTION INTRAVENOUS at 16:35

## 2022-01-01 RX ADMIN — HALOPERIDOL LACTATE 5 MG: 5 INJECTION, SOLUTION INTRAMUSCULAR at 10:16

## 2022-01-01 RX ADMIN — Medication 2000 UNITS: at 09:18

## 2022-01-01 RX ADMIN — MORPHINE SULFATE 2 MG: 2 INJECTION, SOLUTION INTRAMUSCULAR; INTRAVENOUS at 12:32

## 2022-01-01 RX ADMIN — HALOPERIDOL LACTATE 5 MG: 5 INJECTION, SOLUTION INTRAMUSCULAR at 16:15

## 2022-01-01 RX ADMIN — DEXAMETHASONE SODIUM PHOSPHATE 20 MG: 4 INJECTION, SOLUTION INTRAMUSCULAR; INTRAVENOUS at 22:42

## 2022-01-01 RX ADMIN — GUAIFENESIN 600 MG: 600 TABLET, EXTENDED RELEASE ORAL at 08:08

## 2022-01-01 RX ADMIN — SODIUM CHLORIDE: 4.5 INJECTION, SOLUTION INTRAVENOUS at 06:53

## 2022-01-01 RX ADMIN — CEFTRIAXONE SODIUM 1000 MG: 1 INJECTION, POWDER, FOR SOLUTION INTRAMUSCULAR; INTRAVENOUS at 18:39

## 2022-01-01 RX ADMIN — METOPROLOL TARTRATE 5 MG: 5 INJECTION INTRAVENOUS at 10:48

## 2022-01-01 RX ADMIN — MORPHINE SULFATE 2 MG: 2 INJECTION, SOLUTION INTRAMUSCULAR; INTRAVENOUS at 02:19

## 2022-01-01 RX ADMIN — METOROPROLOL TARTRATE 5 MG: 5 INJECTION, SOLUTION INTRAVENOUS at 15:29

## 2022-01-01 RX ADMIN — SODIUM CHLORIDE, PRESERVATIVE FREE 10 ML: 5 INJECTION INTRAVENOUS at 08:27

## 2022-01-01 RX ADMIN — SODIUM CHLORIDE, PRESERVATIVE FREE 10 ML: 5 INJECTION INTRAVENOUS at 09:52

## 2022-01-01 RX ADMIN — DEXAMETHASONE SODIUM PHOSPHATE 6 MG: 10 INJECTION INTRAMUSCULAR; INTRAVENOUS at 20:29

## 2022-01-01 RX ADMIN — METOPROLOL TARTRATE 12.5 MG: 25 TABLET, FILM COATED ORAL at 20:32

## 2022-01-01 RX ADMIN — GUAIFENESIN 600 MG: 600 TABLET, EXTENDED RELEASE ORAL at 20:32

## 2022-01-01 RX ADMIN — CYPROHEPTADINE HYDROCHLORIDE 4 MG: 4 TABLET ORAL at 08:36

## 2022-01-01 RX ADMIN — METOPROLOL TARTRATE 2.5 MG: 5 INJECTION INTRAVENOUS at 12:54

## 2022-01-01 RX ADMIN — Medication 2000 UNITS: at 08:27

## 2022-01-01 RX ADMIN — HALOPERIDOL LACTATE 5 MG: 5 INJECTION, SOLUTION INTRAMUSCULAR at 00:28

## 2022-01-01 RX ADMIN — MORPHINE SULFATE 2 MG: 2 INJECTION, SOLUTION INTRAMUSCULAR; INTRAVENOUS at 18:08

## 2022-01-01 RX ADMIN — METOPROLOL TARTRATE 12.5 MG: 25 TABLET, FILM COATED ORAL at 20:21

## 2022-01-01 RX ADMIN — SODIUM CHLORIDE, PRESERVATIVE FREE 10 ML: 5 INJECTION INTRAVENOUS at 23:45

## 2022-01-01 RX ADMIN — METOPROLOL TARTRATE 12.5 MG: 25 TABLET, FILM COATED ORAL at 08:27

## 2022-01-01 RX ADMIN — METOPROLOL TARTRATE 12.5 MG: 25 TABLET, FILM COATED ORAL at 08:06

## 2022-01-01 RX ADMIN — ZINC SULFATE 220 MG (50 MG) CAPSULE 50 MG: CAPSULE at 09:19

## 2022-01-01 RX ADMIN — MORPHINE SULFATE 2 MG: 2 INJECTION, SOLUTION INTRAMUSCULAR; INTRAVENOUS at 13:38

## 2022-01-01 RX ADMIN — ZINC SULFATE 220 MG (50 MG) CAPSULE 50 MG: CAPSULE at 08:27

## 2022-01-01 RX ADMIN — LORAZEPAM 0.5 MG: 2 INJECTION, SOLUTION INTRAMUSCULAR; INTRAVENOUS at 08:08

## 2022-01-01 RX ADMIN — MORPHINE SULFATE 2 MG: 2 INJECTION, SOLUTION INTRAMUSCULAR; INTRAVENOUS at 15:33

## 2022-01-01 RX ADMIN — METOPROLOL TARTRATE 12.5 MG: 25 TABLET, FILM COATED ORAL at 08:37

## 2022-01-01 RX ADMIN — SODIUM CHLORIDE, PRESERVATIVE FREE 10 ML: 5 INJECTION INTRAVENOUS at 08:07

## 2022-01-01 RX ADMIN — METOPROLOL TARTRATE 12.5 MG: 25 TABLET, FILM COATED ORAL at 08:07

## 2022-01-01 RX ADMIN — OXYCODONE HYDROCHLORIDE AND ACETAMINOPHEN 1000 MG: 500 TABLET ORAL at 08:27

## 2022-01-01 RX ADMIN — CYPROHEPTADINE HYDROCHLORIDE 4 MG: 4 TABLET ORAL at 08:07

## 2022-01-01 RX ADMIN — SODIUM CHLORIDE, PRESERVATIVE FREE 10 ML: 5 INJECTION INTRAVENOUS at 08:37

## 2022-01-01 RX ADMIN — CYPROHEPTADINE HYDROCHLORIDE 4 MG: 4 TABLET ORAL at 18:13

## 2022-01-01 RX ADMIN — WARFARIN SODIUM 2.5 MG: 2.5 TABLET ORAL at 18:09

## 2022-01-01 RX ADMIN — MORPHINE SULFATE 2 MG: 2 INJECTION, SOLUTION INTRAMUSCULAR; INTRAVENOUS at 08:27

## 2022-01-01 RX ADMIN — CYPROHEPTADINE HYDROCHLORIDE 4 MG: 4 TABLET ORAL at 18:39

## 2022-01-01 RX ADMIN — CEFTRIAXONE SODIUM 1000 MG: 1 INJECTION, POWDER, FOR SOLUTION INTRAMUSCULAR; INTRAVENOUS at 18:35

## 2022-01-01 RX ADMIN — GUAIFENESIN 600 MG: 600 TABLET, EXTENDED RELEASE ORAL at 20:29

## 2022-01-01 RX ADMIN — ZINC SULFATE 220 MG (50 MG) CAPSULE 50 MG: CAPSULE at 12:15

## 2022-01-01 RX ADMIN — Medication 2000 UNITS: at 08:07

## 2022-01-01 RX ADMIN — SODIUM CHLORIDE, PRESERVATIVE FREE 10 ML: 5 INJECTION INTRAVENOUS at 20:30

## 2022-01-01 RX ADMIN — SODIUM CHLORIDE, PRESERVATIVE FREE 10 ML: 5 INJECTION INTRAVENOUS at 12:54

## 2022-01-01 RX ADMIN — METOPROLOL TARTRATE 12.5 MG: 25 TABLET, FILM COATED ORAL at 22:41

## 2022-01-01 RX ADMIN — GUAIFENESIN 600 MG: 600 TABLET, EXTENDED RELEASE ORAL at 12:12

## 2022-01-01 RX ADMIN — CYPROHEPTADINE HYDROCHLORIDE 4 MG: 4 TABLET ORAL at 09:18

## 2022-01-01 RX ADMIN — HALOPERIDOL LACTATE 5 MG: 5 INJECTION, SOLUTION INTRAMUSCULAR at 22:10

## 2022-01-01 RX ADMIN — PANTOPRAZOLE SODIUM 40 MG: 40 TABLET, DELAYED RELEASE ORAL at 06:52

## 2022-01-01 RX ADMIN — GUAIFENESIN 600 MG: 600 TABLET, EXTENDED RELEASE ORAL at 09:19

## 2022-01-01 RX ADMIN — PANTOPRAZOLE SODIUM 40 MG: 40 TABLET, DELAYED RELEASE ORAL at 05:16

## 2022-01-01 RX ADMIN — METOPROLOL TARTRATE 25 MG: 25 TABLET, FILM COATED ORAL at 12:13

## 2022-01-01 RX ADMIN — SODIUM CHLORIDE, PRESERVATIVE FREE 10 ML: 5 INJECTION INTRAVENOUS at 12:14

## 2022-01-01 RX ADMIN — CYPROHEPTADINE HYDROCHLORIDE 4 MG: 4 TABLET ORAL at 08:27

## 2022-01-01 RX ADMIN — HYDRALAZINE HYDROCHLORIDE 10 MG: 20 INJECTION INTRAMUSCULAR; INTRAVENOUS at 02:55

## 2022-01-01 RX ADMIN — SODIUM CHLORIDE, PRESERVATIVE FREE 10 ML: 5 INJECTION INTRAVENOUS at 11:43

## 2022-01-01 RX ADMIN — SODIUM CHLORIDE, PRESERVATIVE FREE 10 ML: 5 INJECTION INTRAVENOUS at 08:08

## 2022-01-01 RX ADMIN — OXYCODONE HYDROCHLORIDE AND ACETAMINOPHEN 1000 MG: 500 TABLET ORAL at 08:07

## 2022-01-01 RX ADMIN — CYPROHEPTADINE HYDROCHLORIDE 4 MG: 4 TABLET ORAL at 16:51

## 2022-01-01 RX ADMIN — SODIUM CHLORIDE, PRESERVATIVE FREE 10 ML: 5 INJECTION INTRAVENOUS at 19:37

## 2022-01-01 RX ADMIN — GUAIFENESIN 600 MG: 600 TABLET, EXTENDED RELEASE ORAL at 08:06

## 2022-01-01 RX ADMIN — ACETAMINOPHEN 650 MG: 325 TABLET, FILM COATED ORAL at 16:51

## 2022-01-01 RX ADMIN — SODIUM CHLORIDE, PRESERVATIVE FREE 10 ML: 5 INJECTION INTRAVENOUS at 20:12

## 2022-01-01 RX ADMIN — HALOPERIDOL LACTATE 5 MG: 5 INJECTION, SOLUTION INTRAMUSCULAR at 04:23

## 2022-01-01 RX ADMIN — METOPROLOL TARTRATE 12.5 MG: 25 TABLET, FILM COATED ORAL at 00:41

## 2022-01-01 RX ADMIN — DEXAMETHASONE SODIUM PHOSPHATE 20 MG: 4 INJECTION, SOLUTION INTRAMUSCULAR; INTRAVENOUS at 20:48

## 2022-01-01 RX ADMIN — MORPHINE SULFATE 2 MG: 2 INJECTION, SOLUTION INTRAMUSCULAR; INTRAVENOUS at 16:35

## 2022-01-01 RX ADMIN — METOPROLOL TARTRATE 5 MG: 5 INJECTION INTRAVENOUS at 09:52

## 2022-01-01 RX ADMIN — METOROPROLOL TARTRATE 5 MG: 5 INJECTION, SOLUTION INTRAVENOUS at 05:33

## 2022-01-01 RX ADMIN — SODIUM CHLORIDE, PRESERVATIVE FREE 10 ML: 5 INJECTION INTRAVENOUS at 17:31

## 2022-01-01 RX ADMIN — Medication 2000 UNITS: at 08:37

## 2022-01-01 RX ADMIN — GUAIFENESIN 600 MG: 600 TABLET, EXTENDED RELEASE ORAL at 08:37

## 2022-01-01 RX ADMIN — Medication 2000 UNITS: at 08:06

## 2022-01-01 RX ADMIN — PANTOPRAZOLE SODIUM 40 MG: 40 TABLET, DELAYED RELEASE ORAL at 06:44

## 2022-01-01 RX ADMIN — SODIUM CHLORIDE, PRESERVATIVE FREE 10 ML: 5 INJECTION INTRAVENOUS at 09:18

## 2022-01-01 RX ADMIN — METOROPROLOL TARTRATE 5 MG: 5 INJECTION, SOLUTION INTRAVENOUS at 21:34

## 2022-01-01 RX ADMIN — MORPHINE SULFATE 2 MG: 2 INJECTION, SOLUTION INTRAMUSCULAR; INTRAVENOUS at 17:31

## 2022-01-01 RX ADMIN — CYPROHEPTADINE HYDROCHLORIDE 4 MG: 4 TABLET ORAL at 18:08

## 2022-01-01 RX ADMIN — SODIUM CHLORIDE, PRESERVATIVE FREE 10 ML: 5 INJECTION INTRAVENOUS at 21:00

## 2022-01-01 RX ADMIN — SODIUM CHLORIDE, PRESERVATIVE FREE 10 ML: 5 INJECTION INTRAVENOUS at 22:11

## 2022-01-01 RX ADMIN — METOPROLOL TARTRATE 12.5 MG: 25 TABLET, FILM COATED ORAL at 09:18

## 2022-01-01 RX ADMIN — TOCILIZUMAB 600 MG: 20 INJECTION, SOLUTION, CONCENTRATE INTRAVENOUS at 00:50

## 2022-01-01 RX ADMIN — SODIUM CHLORIDE, PRESERVATIVE FREE 10 ML: 5 INJECTION INTRAVENOUS at 13:37

## 2022-01-01 RX ADMIN — METOPROLOL TARTRATE 12.5 MG: 25 TABLET, FILM COATED ORAL at 21:00

## 2022-01-01 RX ADMIN — GUAIFENESIN 600 MG: 600 TABLET, EXTENDED RELEASE ORAL at 08:27

## 2022-01-01 RX ADMIN — MORPHINE SULFATE 2 MG: 2 INJECTION, SOLUTION INTRAMUSCULAR; INTRAVENOUS at 22:15

## 2022-01-01 RX ADMIN — GUAIFENESIN 600 MG: 600 TABLET, EXTENDED RELEASE ORAL at 22:41

## 2022-01-01 RX ADMIN — OXYCODONE HYDROCHLORIDE AND ACETAMINOPHEN 1000 MG: 500 TABLET ORAL at 08:36

## 2022-01-01 RX ADMIN — GUAIFENESIN 600 MG: 600 TABLET, EXTENDED RELEASE ORAL at 21:00

## 2022-01-01 RX ADMIN — GUAIFENESIN 600 MG: 600 TABLET, EXTENDED RELEASE ORAL at 00:41

## 2022-01-01 RX ADMIN — PHYTONADIONE 2 MG: 10 INJECTION, EMULSION INTRAMUSCULAR; INTRAVENOUS; SUBCUTANEOUS at 16:52

## 2022-01-01 RX ADMIN — METOPROLOL TARTRATE 25 MG: 25 TABLET, FILM COATED ORAL at 20:29

## 2022-01-01 RX ADMIN — DEXAMETHASONE SODIUM PHOSPHATE 10 MG: 10 INJECTION, SOLUTION INTRAMUSCULAR; INTRAVENOUS at 21:24

## 2022-01-01 RX ADMIN — PANTOPRAZOLE SODIUM 40 MG: 40 TABLET, DELAYED RELEASE ORAL at 06:15

## 2022-01-01 RX ADMIN — OXYCODONE HYDROCHLORIDE AND ACETAMINOPHEN 1000 MG: 500 TABLET ORAL at 12:11

## 2022-01-01 RX ADMIN — ZINC SULFATE 220 MG (50 MG) CAPSULE 50 MG: CAPSULE at 08:36

## 2022-01-01 RX ADMIN — METOPROLOL TARTRATE 2.5 MG: 5 INJECTION INTRAVENOUS at 19:37

## 2022-01-01 RX ADMIN — OXYCODONE HYDROCHLORIDE AND ACETAMINOPHEN 1000 MG: 500 TABLET ORAL at 08:06

## 2022-01-01 RX ADMIN — SODIUM CHLORIDE, PRESERVATIVE FREE 10 ML: 5 INJECTION INTRAVENOUS at 18:39

## 2022-01-01 RX ADMIN — PANTOPRAZOLE SODIUM 40 MG: 40 TABLET, DELAYED RELEASE ORAL at 05:33

## 2022-01-01 RX ADMIN — HYDRALAZINE HYDROCHLORIDE 10 MG: 20 INJECTION INTRAMUSCULAR; INTRAVENOUS at 20:13

## 2022-01-01 RX ADMIN — OXYCODONE HYDROCHLORIDE AND ACETAMINOPHEN 1000 MG: 500 TABLET ORAL at 09:18

## 2022-01-01 RX ADMIN — DEXAMETHASONE SODIUM PHOSPHATE 6 MG: 10 INJECTION INTRAMUSCULAR; INTRAVENOUS at 22:11

## 2022-01-01 RX ADMIN — Medication 2000 UNITS: at 12:11

## 2022-01-01 RX ADMIN — HALOPERIDOL LACTATE 5 MG: 5 INJECTION, SOLUTION INTRAMUSCULAR at 12:13

## 2022-01-01 RX ADMIN — GUAIFENESIN 600 MG: 600 TABLET, EXTENDED RELEASE ORAL at 20:20

## 2022-01-01 RX ADMIN — METOROPROLOL TARTRATE 5 MG: 5 INJECTION, SOLUTION INTRAVENOUS at 04:16

## 2022-01-01 RX ADMIN — PANTOPRAZOLE SODIUM 40 MG: 40 TABLET, DELAYED RELEASE ORAL at 06:22

## 2022-01-01 RX ADMIN — CEFTRIAXONE SODIUM 1000 MG: 1 INJECTION, POWDER, FOR SOLUTION INTRAMUSCULAR; INTRAVENOUS at 18:19

## 2022-01-01 RX ADMIN — METOROPROLOL TARTRATE 5 MG: 5 INJECTION, SOLUTION INTRAVENOUS at 02:59

## 2022-01-01 RX ADMIN — DEXAMETHASONE SODIUM PHOSPHATE 6 MG: 10 INJECTION INTRAMUSCULAR; INTRAVENOUS at 20:30

## 2022-01-01 RX ADMIN — DEXAMETHASONE SODIUM PHOSPHATE 10 MG: 10 INJECTION, SOLUTION INTRAMUSCULAR; INTRAVENOUS at 21:01

## 2022-01-01 RX ADMIN — SODIUM CHLORIDE, PRESERVATIVE FREE 10 ML: 5 INJECTION INTRAVENOUS at 15:33

## 2022-01-01 ASSESSMENT — PAIN SCALES - GENERAL
PAINLEVEL_OUTOF10: 0
PAINLEVEL_OUTOF10: 1
PAINLEVEL_OUTOF10: 0
PAINLEVEL_OUTOF10: 1
PAINLEVEL_OUTOF10: 0

## 2022-01-01 ASSESSMENT — PAIN SCALES - PAIN ASSESSMENT IN ADVANCED DEMENTIA (PAINAD)
TOTALSCORE: 4
NEGVOCALIZATION: 0
BODYLANGUAGE: 0
FACIALEXPRESSION: 0
BREATHING: 1
BREATHING: 1
CONSOLABILITY: 1
NEGVOCALIZATION: 0
NEGVOCALIZATION: 0
TOTALSCORE: 1
CONSOLABILITY: 0
FACIALEXPRESSION: 0
FACIALEXPRESSION: 0
BREATHING: 1
TOTALSCORE: 1
TOTALSCORE: 2
CONSOLABILITY: 0
CONSOLABILITY: 1
BODYLANGUAGE: 0
NEGVOCALIZATION: 0
BREATHING: 1
FACIALEXPRESSION: 1
BODYLANGUAGE: 1
BODYLANGUAGE: 0

## 2022-01-05 NOTE — PROGRESS NOTES
[facility]  Medication Management  ANTICOAGULATION    Referring Provider: Dr. Mariel Hurley     GOAL INR: 2.0-3.0     TODAY'S INR: 4.0     WARFARIN Dosage:  Hold warfarin today, take half tablet for 2.5 mg tomorrow then decrease warfarin to half tablet for 2.5 mg MWF and whole 5 mg tablet all other days. Saw patient outside in his car with wife driving. INR (no units)   Date Value   2021 3.2   10/14/2021 2.5   2021 3.6   2021 3.2   06/10/2021 2.5   2021 3   2021 2.5     Medication changes:  none    Notes:    Fingerstick INR drawn per clinic protocol. Patient states no visible blood in urine and no black tarry stool. Denies any missed doses of warfarin. No change in other maintenance medications or in diet. Will recheck INR in 1 week. Patient acknowledges working in consult agreement with pharmacist as referred by his/her physician. I saw patient outside in his car. Patient is not eating because he does not feel well per his wife who is driving the car. Wife states patient is nonverbal following a previous stroke. I asked how he is \"not feeling well\" and she said he has a cough but has not been Covid tested. He is not eating.   Will hold warfarin today then decrease dosage as above and wife asks for appt Thursday next week along with another appt here at the hospital.          [address] [address] [facility] Tracking Only     Intervention Detail: Adherence Monitorin and Dose Adjustment: 3, reason: Therapy Optimization   Total # of Interventions Recommended: 3   Total # of Interventions Accepted: 3   Time Spent (min): [address] [facility], [facility], PharmD

## 2022-01-05 NOTE — PATIENT INSTRUCTIONS
Continue to monitor urine and stool. Continue to monitor for signs of bleeding. Return to clinic in 1 week. Hold warfarin today, take half tablet for 2.5 mg tomorrow then decrease warfarin to half tablet for 2.5 mg MWF and whole 5 mg tablet all other days.

## 2022-01-06 PROBLEM — U07.1 PNEUMONIA DUE TO COVID-19 VIRUS: Status: ACTIVE | Noted: 2022-01-01

## 2022-01-06 PROBLEM — J12.82 PNEUMONIA DUE TO COVID-19 VIRUS: Status: ACTIVE | Noted: 2022-01-01

## 2022-01-07 PROBLEM — E44.0 MODERATE MALNUTRITION (HCC): Status: ACTIVE | Noted: 2022-01-01

## 2022-01-07 NOTE — PROGRESS NOTES
This nurse in with doctor to speak about code status with patient and wife, wife asks patient if he wants life support and everything done in the event that his condition declines, patient shakes his head no. Wife agrees with patient to make him a DNRCC-A no intubation, this is also discussed with the daughter on speaker phone per wife's request and daughter agrees.

## 2022-01-07 NOTE — PROGRESS NOTES
Comprehensive Nutrition Assessment    Type and Reason for Visit:  Positive Nutrition Screen,Initial    Nutrition Recommendations/Plan:  Encourage oral intakes /protein foods    Nutrition Assessment:  Moderate malnutrition r/t altered respiratory status, AEB >2% weight declines in last week and very low PO intakes. Per spouse, over phone low PO ~1 week (likely impacting INR value). No usual ingestion issues and \"not picky\". Agreeable to try some vanilla Ensure    Malnutrition Assessment:  Malnutrition Status: Moderate malnutrition    Context:  Acute Illness     Findings of the 6 clinical characteristics of malnutrition:  Energy Intake:  1 - 75% or less of estimated energy requirements for 7 or more days  Weight Loss:  7 - Greater than 2% over 1 week     Body Fat Loss:  Unable to assess     Muscle Mass Loss:  Unable to assess    Fluid Accumulation:  No significant fluid accumulation     Strength:  Not Performed    Estimated Daily Nutrient Needs:  Energy (kcal):  9712-2111 (20-25); Weight Used for Energy Requirements:  Current     Protein (g):   (1.3-1.5); Weight Used for Protein Requirements:  Ideal        Fluid (ml/day):  1900; Method Used for Fluid Requirements:  1 ml/kcal      Nutrition Related Findings:  DARLENE in isolation, no edema, no record of b/s or bm. Wounds:  None       Current Nutrition Therapies:    ADULT DIET; Regular; Low Fat/Low Chol/High Fiber/TO  ADULT ORAL NUTRITION SUPPLEMENT; Breakfast, Lunch, Dinner; Standard High Calorie/High Protein Oral Supplement    Anthropometric Measures:  · Height: 5' 8\" (172.7 cm)  · Current Body Weight: 164 lb 8 oz (74.6 kg)   · Admission Body Weight: 165 lb (74.8 kg)    · Usual Body Weight: 168 lb (76.2 kg) (per spouse)     · Ideal Body Weight: 154 lbs; % Ideal Body Weight 106.8 %   · BMI: 25  · Adjusted Body Weight:  ; No Adjustment    · BMI Categories: Overweight (BMI 25.0-29. 9)       Nutrition Diagnosis:   · Predicted inadequate energy intake related to impaired respiratory function as evidenced by weight loss    Lab Results   Component Value Date     01/07/2022    K 4.5 01/07/2022     01/07/2022    CO2 26 01/07/2022    BUN 25 (H) 01/07/2022    CREATININE 1.07 01/07/2022    GLUCOSE 174 (H) 01/07/2022    CALCIUM 9.4 01/07/2022    PROT 6.8 01/07/2022    LABALBU 3.0 (L) 01/07/2022    BILITOT 0.67 01/07/2022    ALKPHOS 39 (L) 01/07/2022    AST 49 (H) 01/07/2022    ALT 22 01/07/2022    LABGLOM >60 01/07/2022    GFRAA >60 01/07/2022    GLOB NOT REPORTED 04/19/2015     Lab Results   Component Value Date    LABA1C 5.3 04/19/2015     Lab Results   Component Value Date     04/19/2015     Lab Results   Component Value Date    VITD25 65.8 10/06/2021     Lab Results   Component Value Date    .8 (H) 01/07/2022     Nutrition Interventions:   Food and/or Nutrient Delivery:  Continue Current Diet,Start Oral Nutrition Supplement  Nutrition Education/Counseling:  No recommendation at this time   Coordination of Nutrition Care:  Continue to monitor while inpatient    Goals:  PO >75% meals with increased protein selection and supplement       Nutrition Monitoring and Evaluation:   Behavioral-Environmental Outcomes:  None Identified   Food/Nutrient Intake Outcomes:  Food and Nutrient Intake,Supplement Intake  Physical Signs/Symptoms Outcomes:  Biochemical Data,Weight     Discharge Planning:     Too soon to determine     Electronically signed by Yi Martin RD, LD on 1/7/22 at 9:55 AM EST    Contact: [phone]

## 2022-01-07 NOTE — PLAN OF CARE
Problem: Gas Exchange - Impaired  Goal: Absence of hypoxia  Outcome: Ongoing     Problem: Breathing Pattern - Ineffective  Goal: Ability to achieve and maintain a regular respiratory rate  Outcome: Ongoing     Problem:  Body Temperature -  Risk of, Imbalanced  Goal: Ability to maintain a body temperature within defined limits  Outcome: Ongoing     Problem: Isolation Precautions - Risk of Spread of Infection  Goal: Prevent transmission of infection  Outcome: Ongoing     Problem: Nutrition Deficits  Goal: Optimize nutritional status  Outcome: Ongoing

## 2022-01-07 NOTE — PROGRESS NOTES
Pharmacy Note  Warfarin Consult    Jose Maria Can is a 80 y.o. male for whom pharmacy has been consulted to manage warfarin therapy. Consulting Physician: Irving Christianson  Reason for Admission: Pneumonia due to COVID    Warfarin dose prior to admission: 2.5 mg Mon, Wed and Fri; 5 mg all other days  Warfarin indication: AFib  Target INR range: 2-3     Past Medical History:   Diagnosis Date    Arthritis     Atrial fibrillation ([address]     CAD (coronary artery disease)     Hard of hearing     Hx of blood clots     right arm after iv    Hyperlipidemia     Hypertension     Kidney stones     Pacemaker 11/20/2013    medtronic    Pneumonia     Right bundle branch block     Unspecified cerebral artery occlusion with cerebral infarction     with aphasia                Recent Labs     01/06/22 2010   INR 3.9     Recent Labs     01/06/22 2010   HGB 14.4   HCT 42.7   *       Current warfarin drug-drug interactions: Acetaminophen and dexamethasone      Date             INR        Dose   1/6/2022          3.9  1/7/2022                      Daily PT/INR while inpatient. PT/INR ordered to start 1/7/22. Thank you for the consult. Will continue to follow.     Bella Nowak [facility]  1/7/2022 2:02 AM

## 2022-01-07 NOTE — PROGRESS NOTES
Wife very concerned to leave patient alone d/t his inability to communicate d/t his history of stroke, this nurse spoke with [facility] regarding this matter and the patient being COVID positive, this nurse and [facility] agreed it is in  patient's best interest to allow wife to stay as long as she is aware of the possible risks involved. This nurse speaks with wife and daughter(via phone) to explain the possible risks involved with her staying with the patient, both verbalize understanding and wife states \"I'm at home taking care of him so I am a risk either way\".  This nurse also explains to wife and daughter that this decision may be re-evaluated on a daily basis due to the uncertainties of COVID and our need to be sure we are doing what is in the patient's (and their's) best interest, both verbalize understanding

## 2022-01-07 NOTE — PROGRESS NOTES
Patient is up in chair, wife is at his side, patient is very [place], wife states he reads lips and is having hard time due to the masks. Informed of PPE protocol wife voices understanding. Patient has good appetite this AM. Denies any further needs at this time, call light is within reach.

## 2022-01-07 NOTE — ACP (ADVANCE CARE PLANNING)
ACP conversation not completed as Patient is very [facility]. Spoke with wife to complete discharge planning assessment via telephone conversation.     [address]  1/7/2022

## 2022-01-07 NOTE — H&P
Hospital Medicine  History and Physical    Patient:  Eder Ordonez  MRN: [id number]    CHIEF COMPLAINT:  Shortness of breath (SOB)    History Obtained From:  patient, electronic medical record  PCP: DAMIEN HERNANDEZ    HISTORY OF PRESENT ILLNESS:   The patient is a 80 y.o. male who presents to the ER with increasing SOB. According to MARIANNE (who gives a limited history secondary to being aphasic) and EMR, he started with symptoms just before Ailyn with cough, malaise, and shortness of breath. He developed symptoms after he was exposed to his daughter who contracted COVID 3 weeks ago. Over the next several days his breathing continued to worsen. The cough has been primarily dry with occasional sputum production. He had one episode of diarrhea. Appetite has not been as good, no nausea. He denies any increased swelling in the legs or leg pain. He was not taking any medication for his symptoms. MARIANNE had been monitoring his oxygen saturation at home with his SPO2 dropping to 73% yesterday. He was brought to the ER for further evaluation. His oxygen saturation was 64% on room air when he got to the ER. This improved with high flow nasal cannula. CMP was normal other than a glucose of 151 and an AST of 57. CBC was normal other than a platelet count of 406. COVID was positive. CXR showed  Slight scattered patchy haziness and markings are noted in the middle and    lower lung zones bilaterally which may represent early multifocal pneumonia or    perhaps slight pulmonary edema/pneumonitis. MARIANNE is admitted for IV Decadron, Oxygen support with respiratory treatments, and close monitoring of condition.       Past Medical History:        Diagnosis Date    Arthritis     Atrial fibrillation ([address]     CAD (coronary artery disease)     Hard of hearing     Hx of blood clots     right arm after iv    Hyperlipidemia     Hypertension     Kidney stones     Pacemaker 11/20/2013    medtronic    Pneumonia     Right bundle branch block     Unspecified cerebral artery occlusion with cerebral infarction     with aphasia       Past Surgical History:        Procedure Laterality Date    CATARACT REMOVAL WITH IMPLANT Left 7-8-13    CATARACT REMOVAL WITH IMPLANT Right 08/05/13    COLONOSCOPY      EYE SURGERY      [facility]PA[facility]  2009    [facility]    KIDNEY STONE SURGERY      [id number]    PACEMAKER PLACEMENT Left 11/20/13    SHOULDER SURGERY  2002    [facility]       Medications Prior to Admission:  I obtained, documented, reviewed, and updated the patient's current medications. Prior to Admission medications    Medication Sig Start Date End Date Taking? Authorizing Provider   cyproheptadine (PERIACTIN) 4 MG tablet Take 1 tablet by mouth 2 times daily 10/12/21  Yes Luis Enrique Bryant MD   warfarin (COUMADIN) 5 MG tablet TAKE 1/2 tablet on Wednesdays and Sundays and take 1 TABLET daily all other days of the week or as directed by [facility] Medication Management.  9/2/21  Yes Luis Enrique Bryant MD   metoprolol tartrate (LOPRESSOR) 25 MG tablet Take 1/2 tablet twice a day 1/26/21  Yes Luis Enrique Bryant MD   vitamin D (CHOLECALCIFEROL) 1000 UNIT TABS tablet Take 2,000 Units by mouth daily    Yes Historical Provider, MD   Zinc Gluconate 15 MG TABS Take 1 tablet by mouth daily    Yes Historical Provider, MD   LUTEIN PO Take 5 mg by mouth daily   Yes Historical Provider, MD   LECITHIN PO Take 6.2 g by mouth daily 3.1 grams per tablet   Yes Historical Provider, MD   Coenzyme Q10 (CO Q 10 PO) Take 100 mg by mouth daily    Yes Historical Provider, MD   ascorbic acid (VITAMIN C) 500 MG tablet Take 1,000 mg by mouth daily    Yes Historical Provider, MD   Omega-3 Fatty Acids (OMEGA-3 FISH OIL) 1200 MG CAPS Take 2,400 mg by mouth daily With  mg,  mg    Yes Historical Provider, MD   Magnesium 100 MG TABS Take 100 mg by mouth daily    Yes Historical Provider, MD zuniga palmetto 160 MG capsule Take 160 mg by mouth daily   Yes Historical Provider, MD   Vitamin Mixture (VITAMIN E-SELENIUM) 400-50 UNIT-MCG CAPS Take 2 tablets by mouth daily With grape seed extract 38 mg   Yes Historical Provider, MD   B Complex Vitamins (B COMPLEX 1 PO) Take 3 tablets by mouth daily shaklee supplement B-complex (high potency B vitamins with folic acid)   Yes Historical Provider, MD   NONFORMULARY Take 1 tablet by mouth 2 times daily as needed     Historical Provider, MD       Allergies:  Amlodipine, Ciprofloxacin, Naprosyn [naproxen], Other, Lisinopril, and Red dye    Social History:   TOBACCO:   reports that he quit smoking about 39 years ago. He has never used smokeless tobacco.  ETOH:   reports no history of alcohol use. OCCUPATION:      Family History:       Problem Relation Age of Onset    Cancer Mother 76        lung ca    Cancer Father 70        bowel ca    Cancer Sister     Cancer Brother        REVIEW OF SYSTEMS:  Constitutional:  positive for  malaise  negative for  fevers and chills  HEENT:  positive for  aphasia  negative for  earaches and sore throat  Neck:  No lumps or masses  Cardiac:  positive for  dyspnea  negative for  chest pain, palpitations  Respiratory:  positive for  dry cough and dyspnea  Gastrointestinal:  negative for nausea, vomiting, change in bowel habits and abdominal pain  :  negative for frequency and dysuria  Musculoskeletal:  positive for  muscle weakness  Neuro:  Aphasia      Physical Exam:    Vitals: /69   Pulse 74   Temp 97.6 °F (36.4 °C) (Oral)   Resp 18   Ht 5' 8\" (1.727 m)   Wt 164 lb 8 oz (74.6 kg)   SpO2 90% Comment: Simultaneous filing. User may not have seen previous data. BMI 25.01 kg/m²   General appearance: alert, appears stated age and cooperative  Skin: Skin color, texture, turgor normal. No rashes or lesions  HEENT: Head: Normocephalic, no lesions, without obvious abnormality. Eye: Normal external eye, conjunctiva, lids cornea, MARIBEL. Ears: Normal TM's bilaterally.  Normal auditory canals and external ears. Non-tender. Nose: Normal external nose, mucus membranes and septum. Nose: High flow nasal cannula on. Pharynx: Oral mucosa moist, no oral lesions, posterior pharynx without erythema. Neck: no adenopathy and supple, symmetrical, trachea midline  Lungs: Clear anteriorly with a few rales in the bases posteriorly. Heart: regular rate and rhythm, S1, S2 normal, no murmur, click, rub or gallop, regular rate and rhythm, S1, S2 normal and II/VI systolic murmur. Abdomen: soft, non-tender; bowel sounds normal; no masses,  no organomegaly  Extremities: extremities normal, atraumatic, no cyanosis or edema  Neurologic: Mental status: Alert, oriented, thought content appropriate    CBC:   Recent Labs     01/06/22 2010   WBC 5.1   HGB 14.4   *     BMP:    Recent Labs     01/06/22 2010      K 3.9      CO2 25   BUN 23   CREATININE 1.11   GLUCOSE 151*     Hepatic:   Recent Labs     01/06/22 2010   AST 57*   ALT 25   BILITOT 0.79   ALKPHOS 46     Troponin: No results for input(s): TROPONINI in the last 72 hours. BNP: No results for input(s): BNP in the last 72 hours. Lipids: No results for input(s): CHOL, HDL in the last 72 hours. Invalid input(s): LDLCALCU  ABGs: No results found for: PHART, PO2ART, ONG7QUB  INR:   Recent Labs     01/05/22  1344 01/06/22 2010   INR 4 3.9     -----------------------------------------------------------------    EKG: Interpreted by me:  Atrial fib with RVR and PVC's, RBBB. Assessment and Plan   1. COVID Pneumonia - started on IV Decadron 10 mg daily on 1/6/22. Received a dose of IV Actemra on 1/6/22. Outside the window for Remdesivir. Placed on Acapella and Mucinex to help with sputum clearance. Placed on EZPAP to help prevent atelectasis. Taking Vitamin D, C, and Zinc.  DNR CC - A (patient refuses intubation). 2.  Acute hypoxic respiratory failure - controlled on high flow nasal cannula.   3.  H/O [facility] - Medtronic pacemaker placed on 11/20/13. 4.  Paroxysmal atrial fib - on coumadin with pharmacy managing. Was in atrial fib with RVR upon admission but converted back to atrial pacing. On Lopressor 12.5 mg BID. 5.  H/O CVA which occurred after patient stopped coumadin. He has aphasia. 6. HTN - has been labile in the past.  7.  Hyperlipidemia - untreated. 8.  BPH - takes Saw palmetto at home. Medical Necessity: In patient is appropriate for this patient secondary to pulmonary support. Estimated length of stay 3 days. The beneficiary may reasonably be expected to be discharged or transferred to a hospital within 96 hours after admission. Patient Active Problem List   Diagnosis Code    Hyperlipidemia E78.5    Hypertension I10    Kidney stones N20.0    Senile nuclear sclerosis H25.10    Right bundle branch block I45.10    Pacemaker Z95.0    Cerebral infarction ([facility] [address] I63.9    Acute ischemic left MCA stroke (HCC) I63.512    Cerebral infarction (HCC) I63.9    Aphasia R47.01    Right hemiparesis (HCC) G81.91    Atrial fibrillation (HCC) I48.91    Long term current use of anticoagulant therapy Z79.01    Vitamin D deficiency disease E55.9    Pneumonia due to COVID-19 virus U07.1, J12.82       DVT prophylaxis:   [] Lovenox   [] SCDs   [] SQ Heparin   [] Encourage ambulation, low risk for DVT, no chemical or mechanical    prophylaxis necessary      [x] Already on Anticoagulation      Documentation of the Current Medications in the Medical Record    (x)  I have utilized all available immediate resources to obtain, update, or review the patient's current medications. (Satisfies MIPS Performance)  If Yes, Stop Here  ( )  The patient is not eligible for medication reconciliation; the patient is in an emergent medical situation where delaying treatment would jeopardize the patient's health.   (MIPS Performance exception / exclusion)  ( )  I did not confirm, update or review the patient's current list of medications today.  (Does not satisfy MIPS Performance)      Advanced Care Plan    (x)  I confirmed that the patient's Advanced Care Plan is present, code status documented, or surrogate decision maker is listed in the patient's medical record. ( )  The patient's advanced care plan is not present because:  (select)   ( ) I confirmed today that the patient does not wish or was not able to name a surrogate decision maker or provide an [address] ( ) Hospice care is currently being provided or has been provided this calender year. ( )  I did not confirm today the presence of an [address] or surrogate decision maker documented within the patient's medical record. (Does not satisfy MIPS performance).             Xiao Redmond MD, MD  Admitting Hospitalist

## 2022-01-07 NOTE — PROGRESS NOTES
Spoke with wife this a.m. to discuss discharge planning. Patient is an 80year old , white male, admitted with a diagnosis of Pneumonia due to Covid 19. Patient is alert but very [address]. Wife answering discharge planning assessment questions. States that Patient will return home with her upon discharge. Patient and wife reside in [place]. They have adult children and grandchildren who live close by and are involved and supportive. Patient has a walker to use at home as needed. No outside services or resources currently in place. Patient drives himself or the kids provide for his transportation needs. Wife does not drive. Son builds handicapped accessible houses around the area and wife states that they have considered moving. PCP is Dr. Juan Prasad from [place]. Medications are affordable according to wife's report. Discharge plan is home with wife when stable. Wife and daughter are listed as Patient decision maker. Patient has a '[address]' order in place. No anticipated needs or concerns related to Patient discharge are identified by wife at this time. LSW to monitor and assist with further discharge planning needs as they arise.     Jono. [address]  1/7/2022

## 2022-01-07 NOTE — CONSULTS
The Pharmacist should review the patient information to make sure criteria for use is met prior to dispensing. If the documentation does not support the criteria, the pharmacist should call the MD to verify the criteria is met. It is preferred that the provider is ID or pulmonary specialist, however the pharmacist can use clinical judgement as to the appropriateness of the order. [facility] Tocilizumab Criteria for Use    Criteria **All criteria need to be met to receive Tocilizumab for COVID-19 patients**   Age  >22 years old    Clinical Status  Within 7 days of symptom onset OR within 2 days of hospital admission   AND  Within 24 hours of vital (respiratory/cardiovascular) organ support initiation*   Concomitant Therapy Receiving systemic corticosteroids    Oxygen Saturation  <92% OR requiring oxygen    CRP   (if available) >7.5 mg/dL   (>75mg/L)   Contraindications Invasive active mycobacterial or fungal infection  Platelets < 881,680 or active bleeding   Not receiving systemic steroids    Ordering Provider Type  ID, intensivists, pulmonology (where available, see above)     *Respiratory support includes but is not limited to: Non-invasive/invasive ventilation   *Cardiovascular support includes but is not limited to: Vasopressor support     I  Weight-Based Tocilizumab Dose (x 1 dose only)  WHEN COMPOUNDED FROM IV VIAL  Patient Weight (ABW, in kg) Tocilizumab (Actemra) Dose   <= 40 kg 8 mg/kg  x 1 dose   >40 kg - <= 65 kg 400 mg x 1 dose   > 65 - <=90 kg 600 mg  x 1 dose   > 90 kg  800mg  x 1 dose     WHEN COMPOUNDED FROM SQ SYRINGE  Patient Weight (ABW, in kg) Tocilizumab (Actemra) Dose   <= 40 kg 324mg (2 syringes)   >40 kg - <= 65 kg 486mg (3 syringes)   > 65 - <=90 kg 648mg (4 syringes)   > 90 kg  810mg (5 syringes)      Actemra 600 mg x 1 dose given 1/7/2022 at 0050 (compounded from IV vial for patient weighing 74.6 kg) per consult order from Dr. Jorje Cooper.      Anne Harris, PharmD

## 2022-01-07 NOTE — ED PROVIDER NOTES
[address] COMPLAINT    Chief Complaint   Patient presents with    Positive For Covid-19     Patient arrives to ER today with his wife with complaints of hypoxia at home with O2 saturation of 73%. Patient has been around his daughter who was positive for COVID 3 weeks ago. Pt's symptoms started right before Ailyn. Patient arrives with O2 saturation of 64% on room air.  Shortness of Breath       RO Jimenez is a 80 y.o. male who presentsto ED from home. By car. With complaint of shortness of breath. Patient has been short of breath for the past few days. Patient's daughter was diagnosed with COVID-19, 3 weeks ago. Onset several days. On arrival to ED patient's oxygen saturation was 64% on room air.   Patient is has history of atrial fibrillation, history of coronary artery disease        PAST MEDICAL HISTORY    Past Medical History:   Diagnosis Date    Arthritis     Atrial fibrillation ([address]     CAD (coronary artery disease)     Hard of hearing     Hx of blood clots     right arm after iv    Hyperlipidemia     Hypertension     Kidney stones     Pacemaker 11/20/2013    medtronic    Pneumonia     Right bundle branch block     Unspecified cerebral artery occlusion with cerebral infarction     with aphasia       SURGICAL HISTORY    Past Surgical History:   Procedure Laterality Date    CATARACT REMOVAL WITH IMPLANT Left 7-8-13    CATARACT REMOVAL WITH IMPLANT Right 08/05/13    COLONOSCOPY      EYE SURGERY      [facility]    KIDNEY STONE SURGERY      [id number]    PACEMAKER PLACEMENT Left 11/20/13    SHOULDER SURGERY  2002    [facility]       CURRENT MEDICATIONS    Current Outpatient Rx   Medication Sig Dispense Refill    cyproheptadine (PERIACTIN) 4 MG tablet Take 1 tablet by mouth 2 times daily 60 tablet 3    warfarin (COUMADIN) 5 MG tablet TAKE 1/2 tablet on Wednesdays and Sundays and take 1 TABLET daily all other days of the week or as directed by [facility] Medication Management. 90 tablet 3    metoprolol tartrate (LOPRESSOR) 25 MG tablet Take 1/2 tablet twice a day 90 tablet 3    vitamin D (CHOLECALCIFEROL) 1000 UNIT TABS tablet Take 2,000 Units by mouth daily       Zinc Gluconate 15 MG TABS Take 1 tablet by mouth daily       LUTEIN PO Take 5 mg by mouth daily      LECITHIN PO Take 6.2 g by mouth daily 3.1 grams per tablet      Coenzyme Q10 (CO Q 10 PO) Take 100 mg by mouth daily       ascorbic acid (VITAMIN C) 500 MG tablet Take 1,000 mg by mouth daily       Omega-3 Fatty Acids (OMEGA-3 FISH OIL) 1200 MG CAPS Take 2,400 mg by mouth daily With  mg,  mg       Magnesium 100 MG TABS Take 100 mg by mouth daily       saw palmetto 160 MG capsule Take 160 mg by mouth daily      Vitamin Mixture (VITAMIN E-SELENIUM) 400-50 UNIT-MCG CAPS Take 2 tablets by mouth daily With grape seed extract 38 mg      B Complex Vitamins (B COMPLEX 1 PO) Take 3 tablets by mouth daily shaklee supplement B-complex (high potency B vitamins with folic acid)      NONFORMULARY Take 1 tablet by mouth 2 times daily as needed          ALLERGIES    Allergies   Allergen Reactions    Amlodipine     Ciprofloxacin      Other reaction(s):  Intolerance  tendonitis in left wrist.      Naprosyn [Naproxen]      Continuous sneezing; pt says he had \"chest pain/pressure\" after taking     Other Itching     Monitor stickers - if left on for a long period of time; ok for EKG      Lisinopril Rash    Red Dye Itching       FAMILY HISTORY    Family History   Problem Relation Age of Onset    Cancer Mother 76        lung ca    Cancer Father 70        bowel ca    Cancer Sister     Cancer Brother        SOCIAL HISTORY    Social History     Socioeconomic History    Marital status:      Spouse name: Blossom Michaels Number of children: 3    Years of education: 25    Highest education level: None   Occupational History    None   Tobacco Use    Smoking status: Former Smoker     Quit date: 1982     Years since quittin.1    Smokeless tobacco: Never Used   Substance and Sexual Activity    Alcohol use: No    Drug use: No    Sexual activity: None   Other Topics Concern    None   Social History Narrative    None     Social Determinants of Health     Financial Resource Strain:     Difficulty of Paying Living Expenses: Not on file   Food Insecurity:     Worried About Running Out of Food in the Last Year: Not on file    Xiomy of Food in the Last Year: Not on file   Transportation Needs:     Lack of Transportation (Medical): Not on file    Lack of Transportation (Non-Medical):  Not on file   Physical Activity:     Days of Exercise per Week: Not on file    Minutes of Exercise per Session: Not on file   Stress:     Feeling of Stress : Not on file   Social Connections:     Frequency of Communication with Friends and Family: Not on file    Frequency of Social Gatherings with Friends and Family: Not on file    Attends Anabaptist Services: Not on file    Active Member of [address] [place] [address] or Organizations: Not on file    Attends Club or Organization Meetings: Not on file    Marital Status: Not on file   Intimate Partner Violence:     Fear of Current or Ex-Partner: Not on file    Emotionally Abused: Not on file    Physically Abused: Not on file    Sexually Abused: Not on file   Housing Stability:     Unable to Pay for Housing in the Last Year: Not on file    Number of [place] in the Last Year: Not on file    Unstable Housing in the Last Year: Not on file           Review of Systems:  Constitutional:  generalized weakness  Eyes:  Denies photophobia or discharge   HENT:  Denies sore throat or ear pain   Respiratory: Positive for cough and shortness of breath   cardiovascular:  Denies chest pain, palpitations or swelling   GI:  Denies abdominal pain, nausea, vomiting, or diarrhea   Musculoskeletal:  Denies back pain   Skin:  Denies rash Neurologic:  Denies headache, focal weakness or sensory changes   Endocrine:  Denies polyuria or polydypsia   Lymphatic:  Denies swollen glands   Psychiatric:  Denies depression, suicidal ideation or homicidal ideation   All systems negative except as marked. PHYSICAL EXAM    VITAL SIGNS: /69   Pulse 122   Temp 99.9 °F (37.7 °C) (Oral)   Resp (!) 35   Ht 5' 8\" (1.727 m)   Wt 165 lb (74.8 kg)   SpO2 (!) 88%   BMI 25.09 kg/m²    Constitutional: Ill-appearing elderly male hypoxic on arrival  HENT:  Normocephalic, Atraumatic, Bilateral external ears normal, Oropharynx moist, No oral exudates, Nose normal. Neck- Normal range of motion, No tenderness, Supple, No stridor. Eyes:  PERRL, EOMI, Conjunctiva normal, No discharge. Respiratory: Diminished breath sounds bilaterally with rhonchi bilaterally  Cardiovascular: Irregular tachycardic rhythm history of A. fib  GI:  Bowel sounds normal, Soft, No tenderness, No masses, No pulsatile masses. : External genitalia appear normal, No masses or lesions. No discharge. No CVA tenderness. Musculoskeletal:  Intact distal pulses, No edema, No tenderness, No cyanosis, No clubbing. Good range of motion in all major joints. No tenderness to palpation or major deformities noted. Back- No tenderness. Integument:  Warm, Dry, No erythema, No rash. Lymphatic:  No lymphadenopathy noted. Neurologic: Patient is awake, patient has of expressive aphasia secondary to CVA  Psychiatric: Patient is able to comprehend  EKG    Atrial fibrillation with rapid ventricular response    RADIOLOGY    XR CHEST PORTABLE   Final Result         1. Slight scattered patchy haziness and markings are noted in the middle and    lower lung zones bilaterally which may represent early multifocal pneumonia or    perhaps slight pulmonary edema/pneumonitis.                    PROCEDURES        Labs  Labs Reviewed   COVID-19, RAPID - Abnormal; Notable for the following components: Result Value    SARS-CoV-2, Rapid DETECTED (*)     All other components within normal limits   CBC WITH AUTO DIFFERENTIAL - Abnormal; Notable for the following components:    Platelets 537 (*)     Seg Neutrophils 82 (*)     Lymphocytes 8 (*)     Monocytes 10 (*)     Absolute Lymph # 0.40 (*)     All other components within normal limits   COMPREHENSIVE METABOLIC PANEL - Abnormal; Notable for the following components:    Glucose 151 (*)     Bun/Cre Ratio 21 (*)     AST 57 (*)     Albumin 3.3 (*)     All other components within normal limits   C-REACTIVE PROTEIN - Abnormal; Notable for the following components:    .8 (*)     All other components within normal limits   PROTIME-INR - Abnormal; Notable for the following components:    Protime 36.2 (*)     All other components within normal limits             Summation      Patient Course: Patient has COVID-19 pneumonia with hypoxia. Patient is discussed with Dr. Devyn Pan. Patient will be admitted for further evaluation and treatment. ED Medications administered this visit:    Medications   ipratropium-albuterol (DUONEB) nebulizer solution 1 ampule (1 ampule Inhalation Given 1/6/22 2035)   dexamethasone (PF) (DECADRON) injection 10 mg (10 mg IntraVENous Given 1/6/22 2124)       New Prescriptions from this visit:    New Prescriptions    No medications on file       Follow-up:  No follow-up provider specified. Final Impression:   1. Shortness of breath    2. Hypoxia    3.  Pneumonia due to COVID-19 virus               (Please note that portions of this note were completed with a voice recognition program.  Efforts were made to edit the dictations but occasionally words are mis-transcribed.)        Meagan Mendzoa MD  01/06/22 1271

## 2022-01-07 NOTE — PROGRESS NOTES
Quality flow rounds held on 1/7/22     Amy Shin is admitted for  Covid 19 pneumonia    Length of stay 1. Education:    Needed Education: covid 19, pneumonia, meds, follow up,diet      Do you have any questions regarding your plan of care while at the hospital? denies    Planned Disposition:               [x]  Home when able                [] Swing Bed                [] ECF/SNF               [] Other/TBD    Barriers to Discharge:    Can you afford your medications? yes   Do you have transportation to follow up appointments? Family provides   Do you need any new equipment at home? denies   Current equipment includes   has a walker at home if needed    Do you have a living will or durable power of  for healthcare? yes               If yes do we have a copy on file? yes    Do you or your family have any questions or concerns we haven't already discussed? Yes    Phone call placed to room and spoke with wife Morena nAaya due to covid diagnosis and pt inability to speak with staff per  and [facility]. Lives with wife, supportive family. PCP is Dr Benitez Montiel. Plans to return home and denies needs at this time.

## 2022-01-07 NOTE — PROGRESS NOTES
Pt arrives to ER via private vehicle. Pt's SpO2 is 64% on RA. Pt placed on 10L High Flow NC. Current SpO2 is 86%.

## 2022-01-08 NOTE — PROGRESS NOTES
Hospitalist Progress Note  1/8/2022 6:19 AM  Subjective:   Admit Date: 1/6/2022  PCP: Yan Mishra    Interval History: Loyda Serrano has no complaints this am.  He denies chest pain or SOB. Occasional cough with very little production. Appetite was better yesterday. Wife states his bowels moved yesterday and he denies any trouble urinating. Speech is good this morning. His wife states his speech comes and goes. Diet: ADULT DIET; Regular; Low Fat/Low Chol/High Fiber/TO  ADULT ORAL NUTRITION SUPPLEMENT; Breakfast, Lunch, Dinner; Standard High Calorie/High Protein Oral Supplement  Medications:   Scheduled Meds:   warfarin (COUMADIN) daily dosing (placeholder)   Other RX Placeholder    pantoprazole  40 mg Oral QAM AC    zinc sulfate  50 mg Oral Daily    cefTRIAXone (ROCEPHIN) IV  1,000 mg IntraVENous Q24H    ascorbic acid  1,000 mg Oral Daily    cyproheptadine  4 mg Oral BID    Magnesium  100 mg Oral Daily    metoprolol tartrate  12.5 mg Oral BID    saw palmetto  160 mg Oral Daily    Vitamin D  2,000 Units Oral Daily    Vitamin E-Selenium  2 tablet Oral Daily    sodium chloride flush  5-40 mL IntraVENous 2 times per day    dexamethasone  10 mg IntraVENous Q24H    guaiFENesin  600 mg Oral BID     Continuous Infusions:   sodium chloride         Patient's current medications documented, reviewed, and updated. CBC:   Recent Labs     01/06/22 2010   WBC 5.1   HGB 14.4   *     BMP:    Recent Labs     01/06/22 2010 01/07/22  0505    140   K 3.9 4.5    104   CO2 25 26   BUN 23 25*   CREATININE 1.11 1.07   GLUCOSE 151* 174*     Hepatic:   Recent Labs     01/06/22 2010 01/07/22  0505   AST 57* 49*   ALT 25 22   BILITOT 0.79 0.67   ALKPHOS 46 39*     Troponin: No results for input(s): TROPONINI in the last 72 hours. BNP: No results for input(s): BNP in the last 72 hours. Lipids: No results for input(s): CHOL, HDL in the last 72 hours.     Invalid input(s): LDLCALCU  INR:   Recent Labs 01/06/22 2010 01/07/22  0506 01/08/22  0504   INR 3.9 4.2 5.5*         Objective:   Vitals: /73   Pulse 72   Temp 97.7 °F (36.5 °C) (Oral)   Resp 20   Ht 5' 8\" (1.727 m)   Wt 164 lb 8 oz (74.6 kg)   SpO2 90%   BMI 25.01 kg/m²   General appearance: alert and cooperative with exam  HEENT: Head: Normocephalic, no lesions, without obvious abnormality. Eye: Normal external eye, conjunctiva, lids cornea, MARIBEL. Nose: Normal external nose, mucus membranes and septum. Nose: high flow nasal cannula on. Neck: no adenopathy and supple, symmetrical, trachea midline  Lungs: clear to auscultation bilaterally  Heart: regular rate and rhythm and S1, S2 normal  Abdomen: soft, non-tender; bowel sounds normal; no masses,  no organomegaly  Extremities: extremities normal, atraumatic, no cyanosis or edema  Neurologic: Mental status: Alert, oriented, thought content appropriate    Assessment and Plan:   1. COVID Pneumonia - started on IV Decadron 10 mg daily on 1/6/22. Received a dose of IV Actemra on 1/6/22. Outside the window for Remdesivir. Placed on Acapella and Mucinex to help with sputum clearance. Placed on EZPAP to help prevent atelectasis. Taking Vitamin D, C, and Zinc.  DNR CC - A (patient refuses intubation). 2.  Acute hypoxic respiratory failure - controlled on high flow nasal cannula. 3.  H/O SSS - Medtronic pacemaker placed on 11/20/13. 4.  Paroxysmal atrial fib - on coumadin with pharmacy managing. Was in atrial fib with RVR upon admission but converted back to atrial pacing. On Lopressor 12.5 mg BID. 5.  H/O CVA which occurred after patient stopped coumadin. He has aphasia. 6. HTN - has been labile in the past.  7.  Hyperlipidemia - untreated. 8.  BPH - takes Saw palmetto at home. 9.  UTI - started on IV Rocephin. 10.  Supra-therapeutic INR - coumadin held yesterday. Pharmacy managing. Plan:  1. Continue the current treatment / therapy.   2.  Will need coumadin held

## 2022-01-08 NOTE — PROGRESS NOTES
Pulse ox low. Wide complex tachycardia on monitor. Noelle Vidal at bedside finds that Gloria Mate is unplugged. Machine plugged back in. Pulse ox beings to improve to upper 80's. Tachycardia continues, -140's. Discussed with Dr Mayte Kearns. New order received.

## 2022-01-08 NOTE — PROGRESS NOTES
Spoke with patients daughter Kópasker via phone. Update provided. Daughter requests that if anything were to happen to Isreal Pat during the evening to call [employer]. Emergency contacts updated. HR after second dose of IV lopressor was upper 90's to low 100's. When Isreal Pat got up to the chair for lunch HR climbed to 130's. Pulse ox 90's. HR continued for some time. 2.5 mg IV lopressor given under PRN order. HR decreases to low 100's. Airvo alarming that it is blocked. Connections all checked. NC upside down in patients nose. Respiratory at the bedside. Patients wife states that Isreal Pat removed it earlier and turned it around. HR currently 98 and 96% SpO2.

## 2022-01-08 NOTE — PROGRESS NOTES
Pulse ox mid 80's. Attempted multiple pulse ox probes. Highest SpO2 88-90%. Repositioned to left side. Pulse ox improves to 95-96%. Patient and spouse states that they would be willing to try a Bi-pap if needed.

## 2022-01-08 NOTE — PROGRESS NOTES
Patient and wife educated not to change nasal cannula around or disconnect airvo from wall. The nasal prongs need to be in a specific way for airvo to work, as well as, it needs to be plugged in to stay on. Both acknowledge understanding.

## 2022-01-08 NOTE — PROGRESS NOTES
Pharmacy Note  Warfarin Consult follow-up      Recent Labs     01/08/22  0504   INR 5.5*     Recent Labs     01/06/22 2010   HGB 14.4   HCT 42.7   *       Significant Drug-Drug Interactions:  acetaminophen, ascorbic acid, ceftriaxone, dexamethasone, saw palmetto      Notes:  INR is extremely labile, now at 5.5. Will hold warfarin this evening. Daily PT/INR while inpatient.      Agnieszka Daugherty, PharmD 1/8/2022 12:50 PM

## 2022-01-08 NOTE — PROGRESS NOTES
States that he is not feeling well this am. Difficulty getting pulse ox to read on ear, forehead or finger. Multiple probes used. Respiratory aware and will attempt to trouble shoot. Denies further needs.

## 2022-01-08 NOTE — PROGRESS NOTES
Assisted from chair to bed. Tolerates well. Repositioned for comfort. HR 90's. States that he feels better than earlier today. New ear probe applied. SpO2 75-80% on Airvo 60L at 92%. Respiratory will be in to evaluate.

## 2022-01-09 NOTE — PROGRESS NOTES
Pharmacy Note  Warfarin Consult follow-up      Recent Labs     01/09/22  0415   INR 5.3*     Recent Labs     01/06/22 2010 01/09/22  0415   HGB 14.4 14.5   HCT 42.7 44.2   * 190       Significant Drug-Drug Interactions:  acetaminophen, ascorbic acid, ceftriaxone, dexamethasone, saw palmetto      Notes:  INR still supratherapeutic at 5.3. Will continue to hold warfarin this evening. Daily PT/INR while inpatient.      Altagracia Samson, PharmD 1/9/2022 1:48 PM

## 2022-01-09 NOTE — PROGRESS NOTES
Hospitalist Progress Note  1/9/2022 5:41 AM  Subjective:   Admit Date: 1/6/2022  PCP: Stephen Mijares    Interval History: Sakshi Foreman has no complaints this am.  He is resting comfortable on Airvo. His wife states he told her he was breathing better this morning. No chest pain. Appetite is okay, no nausea. Labs reviewed. Diet: ADULT DIET; Regular; Low Fat/Low Chol/High Fiber/TO  ADULT ORAL NUTRITION SUPPLEMENT; Breakfast, Lunch, Dinner; Standard High Calorie/High Protein Oral Supplement  Medications:   Scheduled Meds:   dexamethasone  20 mg IntraVENous Q24H    dexamethasone (PF)        warfarin (COUMADIN) daily dosing (placeholder)   Other RX Placeholder    pantoprazole  40 mg Oral QAM AC    zinc sulfate  50 mg Oral Daily    cefTRIAXone (ROCEPHIN) IV  1,000 mg IntraVENous Q24H    ascorbic acid  1,000 mg Oral Daily    cyproheptadine  4 mg Oral BID    Magnesium  100 mg Oral Daily    metoprolol tartrate  12.5 mg Oral BID    saw palmetto  160 mg Oral Daily    Vitamin D  2,000 Units Oral Daily    Vitamin E-Selenium  2 tablet Oral Daily    sodium chloride flush  5-40 mL IntraVENous 2 times per day    guaiFENesin  600 mg Oral BID     Continuous Infusions:   sodium chloride         Patient's current medications documented, reviewed, and updated. CBC:   Recent Labs     01/06/22 2010 01/09/22  0415   WBC 5.1 9.9   HGB 14.4 14.5   * 190     BMP:    Recent Labs     01/06/22 2010 01/07/22  0505 01/09/22  0415    140 146*   K 3.9 4.5 4.3    104 110*   CO2 25 26 24   BUN 23 25* 38*   CREATININE 1.11 1.07 1.06   GLUCOSE 151* 174* 156*     Hepatic:   Recent Labs     01/06/22 2010 01/07/22  0505 01/09/22  0415   AST 57* 49* 41*   ALT 25 22 24   BILITOT 0.79 0.67 0.57   ALKPHOS 46 39* 44     Troponin: No results for input(s): TROPONINI in the last 72 hours. BNP: No results for input(s): BNP in the last 72 hours. Lipids: No results for input(s): CHOL, HDL in the last 72 hours.     Invalid input(s): LDLCALCU  INR:   Recent Labs     01/07/22  0506 01/08/22  0504 01/09/22  0415   INR 4.2 5.5* 5.3*         Objective:   Vitals: /68   Pulse 99   Temp 97.5 °F (36.4 °C) (Oral)   Resp 24   Ht 5' 8\" (1.727 m)   Wt 164 lb 8 oz (74.6 kg)   SpO2 98%   BMI 25.01 kg/m²   General appearance: alert and cooperative with exam  HEENT: Head: Normocephalic, no lesions, without obvious abnormality. Eye: Normal external eye, conjunctiva, lids cornea, MARIBEL. Nose: Normal external nose, mucus membranes and septum. Nose: Airvo in place. Neck: no adenopathy and supple, symmetrical, trachea midline  Lungs: clear to auscultation bilaterally  Heart: regular rate and rhythm, S1, S2 normal and II/VI systolic murmur  Abdomen: soft, non-tender; bowel sounds normal; no masses,  no organomegaly  Extremities: extremities normal, atraumatic, no cyanosis or edema  Neurologic: Mental status: Alert, oriented, thought content appropriate    Assessment and Plan:   1.  COVID Pneumonia - started on IV Decadron 10 mg daily on 1/6/22 and increased to 20 mg on 1/8/22 secondary to the increase in oxygen requirements.  Received IV Actemra on 1/6/22.  Outside the window for Remdesivir.   Placed on Acapella and Mucinex to help with sputum clearance.  Placed on EZPAP to help prevent atelectasis.  Taking Vitamin D, C, and Zinc.  DNR CC - A (patient refuses intubation).    2.  Acute hypoxic respiratory failure - Started out with high flow nasal cannula but advanced to Airvo on 1/8/22 secondary to drop in SPO2. 3.  H/O SSS - Medtronic pacemaker placed on 11/20/13. 4.  Paroxysmal atrial fib - on coumadin with pharmacy managing.  Was in atrial fib with RVR upon admission but converted back to atrial pacing.  Continues to have episodes of atrial fib. On Lopressor 12.5 mg BID. PRN IV Lopressor ordered for HR > 100.    5.  H/O CVA which occurred after patient stopped coumadin. [address] has aphasia (speech fluctuates on what he can say).    6.  HTN - has been labile in the past.  7.  Hyperlipidemia - untreated. 8.  BPH - takes Saw palmetto at home. 9.  UTI - started on IV Rocephin. 10.  Supra-therapeutic INR - coumadin held yesterday. Pharmacy managing (has been held the past 2 days). Plan:  1. Continue the current treatment. 2.  With the increase in sodium and BUN it suggests he is getting a little dry. Will given some gentle IV hydration with repeat BMP in the am.      Patient continues to require in patient admission secondary to pulmonary support.       DVT prophylaxis:   [] Lovenox   [] SCDs   [] SQ Heparin   [] Encourage ambulation, low risk for DVT, no chemical or mechanical    prophylaxis necessary      [x] Already on Anticoagulation    Patient Active Problem List:     Hyperlipidemia     Hypertension     Kidney stones     Senile nuclear sclerosis     Right bundle branch block     Pacemaker     Cerebral infarction ([address]     Acute ischemic left MCA stroke (HCC)     Cerebral infarction (HCC)     Aphasia     Right hemiparesis (HCC)     Atrial fibrillation ([address]     Long term current use of anticoagulant therapy     Vitamin D deficiency disease     Pneumonia due to COVID-19 virus     Moderate malnutrition ([address]      Acacia Elmore MD, MD  Rounding Hospitalist

## 2022-01-10 NOTE — PROGRESS NOTES
Hospitalist Progress Note  1/10/2022 6:40 AM  Subjective:   Admit Date: 1/6/2022  PCP: Nico Reilly History:    The patient is sitting at the edge of the bed and looks comfortable. Reports no complaints. Wife is in the room and thinks that the patient is doing okay. Patient reports no chest pain, no shortness of breath, had no nausea or vomiting. Diet: ADULT DIET; Regular; Low Fat/Low Chol/High Fiber/TO  ADULT ORAL NUTRITION SUPPLEMENT; Breakfast, Lunch, Dinner; Standard High Calorie/High Protein Oral Supplement  Medications:   Scheduled Meds:   dexamethasone  6 mg IntraVENous Q24H    dexamethasone (PF)        warfarin (COUMADIN) daily dosing (placeholder)   Other RX Placeholder    pantoprazole  40 mg Oral QAM AC    zinc sulfate  50 mg Oral Daily    ascorbic acid  1,000 mg Oral Daily    cyproheptadine  4 mg Oral BID    Magnesium  100 mg Oral Daily    metoprolol tartrate  12.5 mg Oral BID    saw palmetto  160 mg Oral Daily    Vitamin D  2,000 Units Oral Daily    Vitamin E-Selenium  2 tablet Oral Daily    sodium chloride flush  5-40 mL IntraVENous 2 times per day    guaiFENesin  600 mg Oral BID     Continuous Infusions:   sodium chloride 75 mL/hr at 01/10/22 0520    sodium chloride       PRN Medications: metoprolol, sodium chloride flush, sodium chloride, ondansetron **OR** ondansetron, polyethylene glycol, acetaminophen **OR** acetaminophen    Objective:   Vitals: BP (!) 135/94   Pulse 97   Temp 97.7 °F (36.5 °C) (Oral)   Resp 22   Ht 5' 8\" (1.727 m)   Wt 164 lb 8 oz (74.6 kg)   SpO2 96%   BMI 25.01 kg/m²   BMI: Body mass index is 25.01 kg/m².     CBC:   Recent Labs     01/09/22  0415   WBC 9.9   HGB 14.5        BMP:    Recent Labs     01/09/22 0415 01/10/22  0430   * 141   K 4.3 4.5   * 107   CO2 24 23   BUN 38* 35*   CREATININE 1.06 1.05   GLUCOSE 156* 158*     Hepatic:   Recent Labs     01/09/22 0415   AST 41*   ALT 24   BILITOT 0.57   ALKPHOS 44 INR:   Recent Labs     01/08/22  0504 01/09/22  0415 01/10/22  0430   INR 5.5* 5.3* 4.3       Physical Exam:  General Appearance: Hard of hearing and has aphasia, awake and alert, in no acute distress  Cardiovascular: normal rate, regular rhythm, normal S1 and S2, 2/6 systolic ejection murmur   pulmonary/Chest: clear to auscultation bilaterally,no wheezes, rales or rhonchi, diminished breath sounds bilaterally, no respiratory distress  Abdomen: soft, non-tender, non-distended, normal bowel sounds   Extremities: no cyanosis, clubbing or edema  Skin: warm and dry, no rash   Neurological: Awake, alert, oriented, aphasia present, no focal findings or movement disorder noted    Assessment and Plan:       1.  COVID Pneumonia - started on IV Decadron 10 mg daily on 1/6/22 and increased to 20 mg on 1/8/22 secondary to the increase in oxygen requirements.  Received IV Actemra on 1/6/22.  Outside the window for Remdesivir.  Evaluated by ID and recommended to decrease Decadron to 6 mg/day. Will change today   Placed on Acapella and Mucinex to help with sputum clearance.  Placed on EZPAP to help prevent atelectasis.  Taking Vitamin D, C, and Zinc.  DNR CC - A (patient refuses intubation).    2.  Acute hypoxic respiratory failure - Started out with high flow nasal cannula but advanced to Airvo on 1/8/22 secondary to drop in SPO2. 3.  H/O SSS - Medtronic pacemaker placed on 11/20/13. 4.  Paroxysmal atrial fib - on coumadin with pharmacy managing.  Was in atrial fib with RVR upon admission but converted back to atrial pacing.  Continues to have episodes of atrial fib. On Lopressor 12.5 mg BID. PRN IV Lopressor ordered for HR > 100.    5.  H/O CVA which occurred after patient stopped coumadin. 6.  HTN - has been labile in the past.  7.  Hyperlipidemia - untreated. 8.  BPH - takes Saw palmetto at home.   5.  UTI - started on IV Rocephin however discontinued by ID.           Advanced Care Plan   (x )  I confirmed that the patient's Advanced Care Plan is present, code status documented, or surrogate decision maker is listed in the patient's medical record. ( )  The patient's advanced care plan is not present because:  (select)   ( ) I confirmed today that the patient does not wish or was not able to name a surrogate decision maker or provide an [address] ( ) Hospice care is currently being provided or has been provided this calender year. ( )  I did not confirm today the presence of an [address] or surrogate decision maker documented within the patient's medical record. (Does not satisfy MIPS performance). Documentation of Current Medications in the Medical Record    ( x)  I have utilized all available immediate resources to obtain, update, or review the patient's current medications. If Yes, Stop Here  ( ) The patient is not eligivel for medications reconciliation; the patient is in an emergent medical situation where delaying treatment would jeopardize the patient's health. ( ) I did not confirm, update or review the patient's current list of medications today.   (does not satisfy MIPS performance)        Patient continues to require inpatient admission related to need for IV steroids      Electronically signed by Afshan Hall MD on 1/10/2022 at 6:40 AM    [facility]

## 2022-01-10 NOTE — PROGRESS NOTES
[facility] Herbal Suspension    To avoid potential drug interactions with herbal and nutritional supplements, it is the policy of [facility] to suspend all orders for these products at the time of admission.   Per hospital policy the following herbal/nutritional supplements were suspended during the hospital stay:    Saw palmetto capsule 160 mg  Vitamin E-Selenium 400-50 UNIT-MCG CAPS     Thank you,  Delmi Fischer, PharmD 1/10/2022 8:14 AM

## 2022-01-10 NOTE — PROGRESS NOTES
SPO2 low on monitor 72%. Upon entering room, wife was assisting patient to void and patient had pulled his O2 off. O2 replaced and SPO2 increases to 91%. Pt remains confused and has difficulty following directions.

## 2022-01-10 NOTE — PROGRESS NOTES
Pt given medications, has difficulty swallowing. Wife at bedside and states that he has to be reminded to swallow the water. Pt follows directions slowly. Pt hs loose cough, but does not expectorate mucus.

## 2022-01-10 NOTE — PLAN OF CARE
Problem: Airway Clearance - Ineffective  Goal: Achieve or maintain patent airway  Outcome: Ongoing     Problem: Gas Exchange - Impaired  Goal: Absence of hypoxia  Outcome: Ongoing  Goal: Promote optimal lung function  Outcome: Ongoing     Problem:  Body Temperature -  Risk of, Imbalanced  Goal: Ability to maintain a body temperature within defined limits  Outcome: Ongoing  Goal: Will regain or maintain usual level of consciousness  Outcome: Ongoing     Problem: Isolation Precautions - Risk of Spread of Infection  Goal: Prevent transmission of infection  Outcome: Ongoing     Problem: Risk for Fluid Volume Deficit  Goal: Maintain normal heart rhythm  Outcome: Ongoing     Problem: Fatigue  Goal: Verbalize increase energy and improved vitality  Outcome: Ongoing     Problem: Falls - Risk of:  Goal: Will remain free from falls  Description: Will remain free from falls  Outcome: Ongoing     Problem: Skin Integrity:  Goal: Will show no infection signs and symptoms  Description: Will show no infection signs and symptoms  Outcome: Ongoing

## 2022-01-10 NOTE — PROGRESS NOTES
[address]    Clinical Pharmacy Note    Warfarin consult follow-up    INR (no units)   Date Value   01/10/2022 4.3   01/09/2022 5.3 (HH)   01/08/2022 5.5 (HH)   01/07/2022 4.2   01/06/2022 3.9   01/05/2022 4   12/02/2021 3.2       Hemoglobin (g/dL)   Date Value   01/09/2022 14.5   01/06/2022 14.4   10/06/2021 15.0     Hematocrit (%)   Date Value   01/09/2022 44.2   01/06/2022 42.7   10/06/2021 44.7     Platelet Count (k/uL)   Date Value   09/29/2011 169     Platelets (k/uL)   Date Value   01/09/2022 190   01/06/2022 113 (L)   10/06/2021 177       Target INR Range: 2-3    Significant Drug-Drug Interactions:  New warfarin drug-drug interactions: none  Discontinued drug-drug interactions: none      Notes:  INR remains supratherapeutic. Dose will continue to be held this evening. Daily PT/INR until stable within therapeutic range.      Juvenal Rothman PharmD 1/10/2022 9:31 AM

## 2022-01-10 NOTE — PROGRESS NOTES
Repositioned pt in prone position due to desaturation . SpO2 increased from 80's to 98% on AirVo2 34 60L 95% FiO2. Acapella, ezpap and IS held at this treatment. Due to desaturation. Will attempt next treatment time.

## 2022-01-10 NOTE — PROGRESS NOTES
Pt sets bed alarm off. Found standing at bedside with airvo and tele pulled off. Pt reoriented to situation. Voids 100ml in urinal plus incontinent. Brief changed. Luz care provided. Draw sheet and chux changed. Pt repositioned back in bed. Call light in reach. Bed alarm on.

## 2022-01-10 NOTE — CONSULTS
[facility] - Initial Consult Note COVID 19 Patient  Today's Date and Time: 1/9/2022, 10:48 PM    Impression :     · COVID 19 Confirmed Infection  · Covid tests:  · 1-6-22: Positive  · Acute hypoxic respiratory failure    Recommendations:   · Antibiotic treatment:  · Monitor off antibiotics  · Ceftriaxone Start date 1-7-22. Suggest D/C Rocephin  · Covid Rx:    · Remdesivir. Out of the window  · Decadron 10 mg Start date 1-6-22  · Decadron 20 mg Start date 1-8-22  · Suggest decrease Decadron to 6 mg per day. To avoid excessive immunosuppression with high dose Decadron and Actemra. · Actemra Administered 1-7-22  · Monoclonal antibodies. Out of the window      Medical Decision Making/Summary/Discussion:1/9/2022     · Patient admitted with COVID 19 infection    Infection Control Recommendations   · Universal Precautions  · Airborne isolation  · Droplet Isolation  · Isolate until 1-27-22    Antimicrobial Stewardship Recommendations     · Simplification of therapy  · Targeted therapy  · IV to oral conversion  · PK dosing  · Restricted antimicrobial use  · Discontinuation of therapy  Coordination of Outpatient Care:   · Estimated Length of IV antimicrobials:TBD  · Patient will need Midline Catheter Insertion: TBD  · Patient will need PICC line Insertion: No  · Patient will need: Home IV , [place],  SNF,  LTAC:TBD  · Patient will need outpatient wound care:No    Chief complaint/reason for consultation:   · Concern for COVID infection      History of Present Illness:   Kiya Pizarro is a 80y.o.-year-old  male who was initially admitted on 1/6/2022. Patient seen at the request of . INITIAL HISTORY:    Patient presented through ER with complaints of increasing shortness of breath, cough and malaise. He indicated he had been exposed to his daughter who had developed Covid around 12-16-21. He then developed early onset of symptoms before [place]. Progressive symptoms ensued along with loss of appetite and one episode of diarrhea. On 1-6-22 his 02 sat at home dropped to 73% leading to his visit to the ER where his 02 sat on room air was 64%. He required high flow 02. His Covid test was positive on 1-6-22. CX showed multifocal pneumonia    Patient admitted because of concerns with COVID 19 pneumonia and hypoxia    CURRENT EVALUATION : 1/9/2022    Afebrile  VS stable    Patient exhibiting respiratory distress. yes  Respiratory secretions: no    Patient receiving supplemental oxygen. High flow  Flow rate 60 L/min  Fi02 87  RR 28  02 sat 93      NEWS Score: 0-4 Low risk group; 5-6: Medium risk group; 7 or above: High risk group  Parameters 3 2 1 0 1 2 3   Age    < 65   ? 65   RR ? 8  9-11 12-20  21-24 ? 25   O2 Sats ? 91 92-93 94-95 ? 96      Suppl O2  Yes  No      SBP ? 90  101-110 111-219   ? 220   HR ? 40  41-50 51-90  111-130 ? 131   Consciousness    Alert   Drowsiness, lethargy, or confusion   Temperature ? 35.0 C (95.0 F)  35.1-36.0 C 95.1-96.9 F 36.1-38.0 C 97.0-100.4 F 38.1-39.0 C 100.5-102.3 F ? 39.1 C ? 102.4 F      NEWS Score:   1-9-22: 12 high risk    Overall Daily Picture:      Worsening    Presence of secondary bacterial Infection:    No   Additional antibiotics: no    Labs, X rays reviewed: 1/9/2022    BUN: 38  Cr: 1.06    WBC: 9.9  Hb: 14.5  Plat: 190    Absolute Neutrophils: 4.2  Absolute Lymphocytes: 0.4  Neutrophil/Lymphocyte Ratio: 10 High Risk    CRP: 132-->74-->32.2  Ferritin: 920  LDH:     Pro Calcitonin:      Cultures:  Urine:  ·   Blood:  ·   Sputum :  ·   Wound:       CXR:   · 1-6-22: Early multifocal pneumonia  CAT:      Discussed with RN, IM.    1-6-22    I have personally reviewed the past medical history, past surgical history, medications, social history, and family history, and I have updated the database accordingly.   Past Medical History:     Past Medical History:   Diagnosis Date    Arthritis     Atrial fibrillation ([address]     CAD (coronary artery disease)     Hard of hearing     Hx of blood clots     right arm after iv    Hyperlipidemia     Hypertension     Kidney stones     Pacemaker 2013    medtronic    Pneumonia     Right bundle branch block     Unspecified cerebral artery occlusion with cerebral infarction     with aphasia       Past Surgical  History:     Past Surgical History:   Procedure Laterality Date    CATARACT REMOVAL WITH IMPLANT Left 13    CATARACT REMOVAL WITH IMPLANT Right 13    COLONOSCOPY      EYE SURGERY      [facility]ERN[facility]PAIR      [facility]    KIDNEY STONE SURGERY      [id number]    PACEMAKER PLACEMENT Left 13    SHOULDER SURGERY      [facility]       Medications:      dexamethasone (PF)        dexamethasone  20 mg IntraVENous Q24H    warfarin (COUMADIN) daily dosing (placeholder)   Other RX Placeholder    pantoprazole  40 mg Oral QAM AC    zinc sulfate  50 mg Oral Daily    cefTRIAXone (ROCEPHIN) IV  1,000 mg IntraVENous Q24H    ascorbic acid  1,000 mg Oral Daily    cyproheptadine  4 mg Oral BID    Magnesium  100 mg Oral Daily    metoprolol tartrate  12.5 mg Oral BID    saw palmetto  160 mg Oral Daily    Vitamin D  2,000 Units Oral Daily    Vitamin E-Selenium  2 tablet Oral Daily    sodium chloride flush  5-40 mL IntraVENous 2 times per day    guaiFENesin  600 mg Oral BID       Social History:     Social History     Socioeconomic History    Marital status:      Spouse name: Roque Lynn Number of children: 3    Years of education: 25    Highest education level: Not on file   Occupational History    Not on file   Tobacco Use    Smoking status: Former Smoker     Quit date: 1982     Years since quittin.1    Smokeless tobacco: Never Used   Substance and Sexual Activity    Alcohol use: No    Drug use: No    Sexual activity: Not on file   Other Topics Concern    Not on file   Social History Narrative    Not on file Social Determinants of Health     Financial Resource Strain:     Difficulty of Paying Living Expenses: Not on file   Food Insecurity:     Worried About Running Out of Food in the Last Year: Not on file    Xiomy of Food in the Last Year: Not on file   Transportation Needs:     Lack of Transportation (Medical): Not on file    Lack of Transportation (Non-Medical): Not on file   Physical Activity:     Days of Exercise per Week: Not on file    Minutes of Exercise per Session: Not on file   Stress:     Feeling of Stress : Not on file   Social Connections:     Frequency of Communication with Friends and Family: Not on file    Frequency of Social Gatherings with Friends and Family: Not on file    Attends Jehovah's witness Services: Not on file    Active Member of [address] [place] [address] [employer] or Organizations: Not on file    Attends Club or Organization Meetings: Not on file    Marital Status: Not on file   Intimate Partner Violence:     Fear of Current or Ex-Partner: Not on file    Emotionally Abused: Not on file    Physically Abused: Not on file    Sexually Abused: Not on file   Housing Stability:     Unable to Pay for Housing in the Last Year: Not on file    Number of [place] in the Last Year: Not on file    Unstable Housing in the Last Year: Not on file       Family History:     Family History   Problem Relation Age of Onset    Cancer Mother 76        lung ca    Cancer Father 70        bowel ca    Cancer Sister     Cancer Brother         Allergies:   Amlodipine, Ciprofloxacin, Naprosyn [naproxen], Other, Lisinopril, and Red dye     Review of Systems:       Constitutional: No fevers or chills. Malaise  Head: No headaches  Eyes: No double vision or blurry vision. No conjunctival inflammation. ENT: No sore throat or runny nose. . No hearing loss, tinnitus or vertigo. Aphasia  Cardiovascular: No chest pain or palpitations. Shortness of breath. PULIDO  Lung: Shortness of breath, dry cough.  No sputum production  Abdomen: No nausea, vomiting. Had  Diarrhea x 1. No abdominal pain. Duglas Pickles No cramps. Genitourinary: No increased urinary frequency, or dysuria. No hematuria. No suprapubic or CVA pain  Musculoskeletal: No muscle aches or pains. No joint effusions, swelling or deformities. Reports muscle weakness  Hematologic: No bleeding or bruising. Neurologic: No headache, weakness, numbness, or tingling. Integument: No rash, no ulcers. Psychiatric: No depression. Endocrine: No polyuria, no polydipsia, no polyphagia. Physical Examination :     Patient Vitals for the past 8 hrs:   BP Temp Temp src Pulse Resp SpO2   01/09/22 2025 (!) 140/79 97.9 °F (36.6 °C) Oral 82 28 93 %   01/09/22 1945 -- -- -- -- -- 92 %   01/09/22 1604 -- -- -- -- -- 91 %     General Appearance: Awake, alert, and in no apparent distress  Head:  Normocephalic, no trauma  Eyes: Pupils equal, round, reactive to light; sclera anicteric; conjunctivae pink. No embolic phenomena. ENT: Oropharynx clear, without erythema, exudate, or thrush. No tenderness of sinuses. Mouth/throat: mucosa pink and moist. No lesions. Dentition in good repair. Neck:Supple, without lymphadenopathy. Thyroid normal, No bruits. Pulmonary/Chest: Decreased breath sounds with posterior rales   Cardiovascular: Regular rate and rhythm with Grade 2/6 LYUDMILA, no rubs, or gallops. Abdomen: Soft, non tender. Bowel sounds normal. No organomegaly  All four Extremities: No cyanosis, clubbing, edema, or effusions. Neurologic: No gross sensory or motor deficits. Aphasia  Skin: Warm and dry with good turgor. Signs of peripheral arterial venous insufficiency. No ulcerations. No open wounds.     Medical Decision Making -Laboratory:   I have independently reviewed/ordered the following labs:    CBC with Differential:   Recent Labs     01/09/22 0415   WBC 9.9   HGB 14.5   HCT 44.2      LYMPHOPCT        MONOPCT          BMP:   Recent Labs     01/07/22  0505 01/09/22  0415    146*   K 4.5 4.3    110* CO2 26 24   BUN 25* 38*   CREATININE 1.07 1.06     Hepatic Function Panel:   Recent Labs     01/07/22  0505 01/09/22  0415   PROT 6.8 6.7   LABALBU 3.0* 3.0*   BILITOT 0.67 0.57   ALKPHOS 39* 44   ALT 22 24   AST 49* 41*     No results for input(s): RPR in the last 72 hours. No results for input(s): HIV in the last 72 hours. No results for input(s): BC in the last 72 hours. Lab Results   Component Value Date    MUCUS NOT REPORTED 01/07/2022    RBC 5.05 01/09/2022    RBC 5.34 09/29/2011    TRICHOMONAS NOT REPORTED 01/07/2022    WBC 9.9 01/09/2022    YEAST NOT REPORTED 01/07/2022    TURBIDITY Hazy 01/07/2022     Lab Results   Component Value Date    CREATININE 1.06 01/09/2022    GLUCOSE 156 01/09/2022    GLUCOSE 96 09/29/2011       Medical Decision Making-Imaging:     EXAM: XR CHEST PORTABLE        HISTORY: Reason for exam:->Hypoxia, suspected COVID-19 pneumonia       COMPARISON: Two-view chest from 10/6/2021.            TECHNIQUE: Portable chest was done at 8:11 PM.           FINDINGS:        Multilead cardiac pacer is noted. Trachea is midline. Mediastinum is not    widened. Heart size is upper limits of normal.       No pneumothorax or nodule or effusion is noted. Slight scattered patchy    haziness is noted in the middle and lower lung zones bilaterally.       Diaphragm is intact. The bony elements show mild osteopenic changes. Postsurgical changes are noted of the right shoulder.                   Impression           1. Slight scattered patchy haziness and markings are noted in the middle and    lower lung zones bilaterally which may represent early multifocal pneumonia or    perhaps slight pulmonary edema/pneumonitis.        Medical Decision Pkvknu-[id number]-Rsxyo:       Medical Decision Making-Other:     Note:  · Labs, medications, radiologic studies were reviewed with personal review of films  · Large amounts of data were reviewed  · Discussed with nursing Staff, Discharge planner  · Infection Control and Prevention measures reviewed  · All prior entries were reviewed  · Administer medications as ordered  · Prognosis: Guarded  · Discharge planning reviewed  · Follow up as outpatient. Thank you for allowing us to participate in the care of this patient. Please call with questions.     Raghavendra Antony MD  Pager: [phone] - Office: [phone]

## 2022-01-11 NOTE — PROGRESS NOTES
Hospitalist Progress Note  1/11/2022 6:26 AM  Subjective:   Admit Date: 1/6/2022  PCP: Peewee Reilly History:    The patient developed episodes of anxiety, shakiness. Wife states that last night he was somewhat restless. Patient denies having pain. On heated high flow cannula with about 80% FiO2 this morning with sats in mid 90s. Diet: ADULT DIET; Regular; Low Fat/Low Chol/High Fiber/TO  ADULT ORAL NUTRITION SUPPLEMENT; Breakfast, Lunch, Dinner; Standard High Calorie/High Protein Oral Supplement  Medications:   Scheduled Meds:   dexamethasone  6 mg IntraVENous Q24H    warfarin (COUMADIN) daily dosing (placeholder)   Other RX Placeholder    pantoprazole  40 mg Oral QAM AC    zinc sulfate  50 mg Oral Daily    ascorbic acid  1,000 mg Oral Daily    cyproheptadine  4 mg Oral BID    Magnesium  100 mg Oral Daily    metoprolol tartrate  12.5 mg Oral BID    Vitamin D  2,000 Units Oral Daily    sodium chloride flush  5-40 mL IntraVENous 2 times per day    guaiFENesin  600 mg Oral BID     Continuous Infusions:   sodium chloride Stopped (01/10/22 0807)     PRN Medications: LORazepam, hydrALAZINE, metoprolol, sodium chloride flush, sodium chloride, ondansetron **OR** ondansetron, polyethylene glycol, acetaminophen **OR** acetaminophen    Objective:   Vitals: BP (!) 144/98   Pulse 105   Temp 97.5 °F (36.4 °C) (Axillary)   Resp 28   Ht 5' 8\" (1.727 m)   Wt 164 lb 8 oz (74.6 kg)   SpO2 97%   BMI 25.01 kg/m²   BMI: Body mass index is 25.01 kg/m². CBC:   Recent Labs     01/09/22  0415   WBC 9.9   HGB 14.5        BMP:    Recent Labs     01/09/22 0415 01/10/22  0430   * 141   K 4.3 4.5   * 107   CO2 24 23   BUN 38* 35*   CREATININE 1.06 1.05   GLUCOSE 156* 158*     Hepatic:   Recent Labs     01/09/22 0415   AST 41*   ALT 24   BILITOT 0.57   ALKPHOS 44     Troponin: No results for input(s): TROPONINI in the last 72 hours.   BNP: No results for input(s): BNP in the last 72 hours. Lipids: No results for input(s): CHOL, HDL in the last 72 hours. Invalid input(s): LDLCALCU  INR:   Recent Labs     01/09/22  0415 01/10/22  0430 01/11/22  0405   INR 5.3* 4.3 4.0       Physical Exam:    General Appearance: Hard of hearing and has aphasia, awake and alert, in no acute distress  Cardiovascular: normal rate, regular rhythm, normal S1 and S2, 2/6 systolic ejection murmur   pulmonary/Chest: clear to auscultation bilaterally,no wheezes, rales or rhonchi, diminished breath sounds bilaterally, no respiratory distress  Abdomen: soft, non-tender, non-distended, normal bowel sounds   Extremities: no cyanosis, clubbing or edema  Skin: warm and dry, no rash   Neurological: Awake, alert, oriented, aphasia present, no focal findings or movement disorder noted      Assessment and Plan:          1.  COVID Pneumonia - started on IV Decadron 10 mg daily  1/6/22 and increased to 20 mg on 1/8/22 secondary to the increase in oxygen requirements.  Received IV Actemra on 1/6/22.  Outside the window for Remdesivir.  Evaluated by ID and recommended to decrease Decadron to 6 mg/day 1/10  Continue on Acapella and Mucinex to help with sputum clearance.  Placed on EZPAP to help prevent atelectasis.  Taking Vitamin D, C, and Zinc.  DNR CC - A (patient refuses intubation).  Ordered as needed Ativan for episodes of restlessness  2.  Acute hypoxic respiratory failure - Started out with high flow nasal cannula but advanced to Airvo on 1/8/22 secondary to drop in SPO2. 3.  H/O SSS - Medtronic pacemaker placed on 11/20/13. 4.  Paroxysmal atrial fib - on coumadin with pharmacy managing.  Was in atrial fib with RVR upon admission but converted back to atrial pacing.  Continues to have episodes of atrial fib.  On Lopressor 12.5 mg BID.  PRN IV Lopressor ordered for HR > 100.    5.  H/O CVA which occurred after patient stopped coumadin. 6.  HTN -blood pressure periodically elevated. Ordered as needed IV hydralazine  7. [id number] [facility] - takes Saw palmetto at home.       Patient continues to require inpatient admission related to need for IV steroids, oxygen supplement, monitoring clinical condition      Electronically signed by Gogo Landry MD on 1/11/2022 at 6:26 AM    Rounding Hospitalist

## 2022-01-11 NOTE — PROGRESS NOTES
Pt less restless, but remains confused. Laying on left side. Aerovo increased related to dropping SPO2. Pt feels warm to touch. Axillary temp is 99.3. Medicated with tylenol.

## 2022-01-11 NOTE — PROGRESS NOTES
Pt's INR has been supratherapeutic for the last week without any warfarin administration. Blood appeared in patients urine today so Dr Meera Dimas interested in giving small dose of Vitamin K to try to decrease INR and stop the blood loss. Because of clotting concerns with COVID, and after further discussion with [facility]'s, a dose of 2 mg was selected to start. Pharmacy will continue to follow.   Delmi Fischer, PharmD 1/11/2022 1:55 PM

## 2022-01-11 NOTE — PROGRESS NOTES
Pt attempts to get up by himself. BSC is provided and he has medium brown stool. Back to bed. Pt given 5 mg IVPS Lopressor for 's-130's.

## 2022-01-11 NOTE — PROGRESS NOTES
Pt less agitated. Attempts to stand at bedside to void, gait unsteady. Voids 75 cc of tea colored urine that is less red than prior. Bladder scan completed to 195cc of urine.   Dr. Ronaldo Monroy called and updated

## 2022-01-11 NOTE — PROGRESS NOTES
Pt's wife called out and relates that he needs to have a BM. When asked pt relates, \"no\". Wife continues to ask pt questions and pt seems to get irritated. HR is up to 140's. 5 mg of Metoprolol is given IVPS as ordered.

## 2022-01-11 NOTE — PROGRESS NOTES
Pt restless, pulling on ECG patches and pulls O2 off. Pt is very agitated. Dr. Roper Blend called and updated on condition with order obtained.

## 2022-01-11 NOTE — PROGRESS NOTES
Pt up to chair with max assist of one. Pull tab alarm placed. Pt remains confused and continues to pull at O2. Replaced multiple times.

## 2022-01-11 NOTE — PROGRESS NOTES
Pt restless and pulling at O2, wife remains at bedside. Pt up to bedside commode with assist of 2. Urine is grossly bloody. Pt returns to bed. SPO2 while up to commode is 84%, increases to 90% after in bed for 10 minutes.

## 2022-01-11 NOTE — PROGRESS NOTES
Wife at bedside, put had pulled O2 off and wife had replaced it. O2 cannula was not in properly. SPO2 quickly drops to 45%. Aerovo replaced and SPO2 elevates to 88% after 10 minutes. Discussion with wife about his care, discussion with wife and daughters about his care. Wife decides to go home. She was informed that if she leaves that she will not be able to return until his is out of isolation. Wife verbalized understanding.

## 2022-01-11 NOTE — PROGRESS NOTES
Pt wife leaves with their daughter. Pt's wife understand she is not allow to come back and be in patient's room while he is in isolation.

## 2022-01-11 NOTE — PROGRESS NOTES
[facility] Pharmacy Department    Clinical Pharmacy Note    Warfarin consult follow-up    INR (no units)   Date Value   01/11/2022 4.0   01/10/2022 4.3   01/09/2022 5.3 (HH)   01/08/2022 5.5 (HH)   01/07/2022 4.2   01/06/2022 3.9   01/05/2022 4       Hemoglobin (g/dL)   Date Value   01/09/2022 14.5   01/06/2022 14.4   10/06/2021 15.0     Hematocrit (%)   Date Value   01/09/2022 44.2   01/06/2022 42.7   10/06/2021 44.7     Platelet Count (k/uL)   Date Value   09/29/2011 169     Platelets (k/uL)   Date Value   01/09/2022 190   01/06/2022 113 (L)   10/06/2021 177       Target INR Range: 2-3    Significant Drug-Drug Interactions:  New warfarin drug-drug interactions: Dexamethasone 6 mg  Discontinued drug-drug interactions: Dexamethasone 20 mg, ceftriaxone, saw palmetto and Vitamin E/Selenium continue to be held      Notes:  INR remains supratherapeutic so dose will continue to be held. Daily PT/INR until stable within therapeutic range.      Harvinder Prater, PharmD 1/11/2022 9:00 AM

## 2022-01-11 NOTE — PROGRESS NOTES
Pt continues to pull O2 off with SPO2 decreasing to 78% on room air. O2 replaced and SPO2 increases to 88%. Pt returned to bed with max assist.  Remains confused.

## 2022-01-11 NOTE — PROGRESS NOTES
Pt is restless and pulling and his aervo off. Wife remains at bedside and attempts to replace oxygen.

## 2022-01-12 NOTE — CONSULTS
[place] [address]                                  CONSULTATION    PATIENT NAME: Corina Ramos                   :        1940  MED REC NO:   [id number]                              ROOM:       [id number]  ACCOUNT NO:   [de-identified]                           ADMIT DATE: 2022  PROVIDER:     Melvin Cummings    CONSULT DATE:  2022    REASON FOR CONSULT:  Paroxysmal atrial fibrillation with RVR. HISTORY OF PRESENT ILLNESS:  The patient is a pleasant 26-year-old  gentleman who I have seen for many years. He has a long history of  paroxysmal atrial fibrillation with RVR with sick sinus syndrome. He  was placed on Coumadin for paroxysmal atrial fibrillation in . He  stopped Coumadin on 2015, and had a subsequent CVA and was  transferred to [facility] with dysphasia. He had also mild weakness  of the right arm. He did agree to restart Coumadin, which he has  remained. 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 He has never had a myocardial infarction or cardiac catheterization. He was admitted with shortness of breath on 2022. His symptoms  started just before A[place]n after being exposed to his daughter who  had COVID. He continued to have worsening shortness of breath and came  to the emergency room with his O2 saturation at 64% on room air when he  got to the ER. This improved with high-flow nasal cannula. He has been treated for COVID pneumonia and has had a difficult time. He was started on Decadron and did receive IV dose of Actemra on  2022. When he arrived in the ER, he was in atrial fibrillation. He then  converted to sinus rhythm. He does have sick sinus syndrome and had a pacemaker implanted on  2013, which is a Medtronic pacemaker, which is approaching end of  life.     When he came to the emergency room, he was in atrial fibrillation but  converted back to an atrial paced rhythm. Last night, he got more confused and more agitated. He developed atrial  fibrillation with RVR with heart rates in the 150s. His blood pressure  remained good in the 120s. He was given IV Lopressor by Dr. Thomas Arredondo  with really no change in his ventricular response. I was called for a  cardiac consult to manage his RVR. He has listed a Norvasc allergy, although I am not sure whether he has a  true allergy or developed pedal edema, which would be a side effect. But because of his listed Norvasc allergy, did not want to give  Cardizem. Since his blood pressure was good, we gave two additional doses of IV  Lopressor and he converted to sinus rhythm when I see him this morning. He has had no chest pain or chest discomfort. His enzymes have remained  negative. He is currently in a sinus rhythm at a rate of 70 beats per  minute. His blood pressure is 130/70. CARDIAC RISK FACTORS:  Prior MI:  Negative. Other Family Members:  Positive. Peripheral Vascular Disease:  Positive. Smoking:  Negative. Hypertension:  Positive. Hyperlipidemia:  Positive. Diabetes:  Negative. MEDICATIONS AT THIS TIME:  Coumadin daily, Ativan injections p.r.n.,  vitamin D 2000 units daily, Zofran p.r.n., Periactin 4 mg b.i.d., Haldol  injections 5 mg every 6 hours p.r.n., Decadron 6 mg every 24 hours,  hydralazine 10 mg IV every 6 hours p.r.n., Lopressor 12.5 mg b.i.d.,  Protonix 40 mg daily, magnesium 100 mg daily, Mucinex 600 mg b.i.d.,  Tylenol p.r.n. PAST MEDICAL AND SURGICAL HISTORY:  1. Right shoulder surgery in 2002. 2.  Hernia repair in 2000. 3.  Multiple kidney stones, with multiple lithotripsies. 4.  Very labile hypertension. 5.  Paroxysmal atrial fibrillation, on Coumadin. 6.  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 7.  Three eye surgeries. 8.  CVA in 04/2015, from which he made a good recovery. 9.  Ventral hernia.     FAMILY HISTORY:  Mother  of cancer. Mother, brother, sister all had  cancer. SOCIAL HISTORY:  He is 80years old, , two children. Does not  smoke or drink alcohol. Retired . REVIEW OF SYSTEMS:  Could not be obtained because of his confusion. PHYSICAL EXAMINATION:  VITAL SIGNS:  His blood pressure was 130/88. His heart rate was down to  70, with an O2 saturation of 94%. HEENT:  The pupils are equally round and intact. NECK:  No JVD. Good carotid pulses. No carotid bruits. CARDIOVASCULAR EXAM:  S1 and S2 were normal.  No S3 or S4. Soft  systolic blowing type murmur. No diastolic murmur. LUNGS:  Diffuse crackles. ABDOMEN:  Soft and nontender. EXTREMITIES:  No edema. LABORATORY DATA:  His sodium was 142, potassium 4.4, BUN 39, creatinine  1.06, GFR greater than 60. His INR was 2.7. White count was 9.9,  hemoglobin 14.5 with a platelet count of [phone]. Rhythm strip this morning showed atrial fibrillation with RVR. EKG done now showed atrial paced rhythm with a right bundle-branch  block, with nonspecific ST changes, with a rate of 73 beats per minute. IMPRESSION:  1. Paroxysmal atrial fibrillation with RVR, with heart rates in the 140  to 160 range, with conversion to normal sinus rhythm with three doses of  IV Lopressor. 2.  COVID pneumonia. 3.  On Coumadin for paroxysmal atrial fibrillation. 4.  Status post MRI-compatible pacemaker implanted on 2013, being  a Medtronic pacemaker, still at beginning of life although approaching  end of life. 5.  On Coumadin since . PLAN:  1. Increase Lopressor to 25 mg b.i.d.  2.  If he has recurrent AFib with RVR, we would load him with IV digoxin  and place him on digoxin 0.125 mg p.o. daily. DISCUSSION:  The patient has a long history of paroxysmal atrial  fibrillation. Now with his COVID pneumonia, he is having more episodes,  and with his agitation, he had RVR in the 130 to 160 range.     His blood pressure is in the 130s, and therefore, there is room to  increase his Lopressor, which I will increase to 25 mg b.i.d. Again, I doubt he has an actual allergy to Norvasc but since it is  listed, I would use digoxin if added medications are required to control  his ventricular response. He does have a pacemaker, and therefore,  fortunately we do not have to worry about bradycardia. From a cardiac standpoint, he is stable. There is no evidence of acute  coronary syndrome or ischemia present. Thank you very much for allowing me the privilege of seeing the patient. If you have any questions on my thoughts, please do not hesitate to  contact me.         Jose Guadalupe Pollock    D: 01/12/2022 7:05:50       T: 01/12/2022 8:23:35     [email or web address]  Job#: [id number]     Doc#: [id number]    CC:  Tequila Koehler

## 2022-01-12 NOTE — PROGRESS NOTES
Pt restless and pulling at bipap mask. IV infiltrated and removed intact with bleeding controlled. IV restarted in left forearm and pt medicated with morphine for restlessness.

## 2022-01-12 NOTE — PROGRESS NOTES
Hospitalist Progress Note  1/12/2022 12:38 PM  Subjective:   Admit Date: 1/6/2022  PCP: Barbra Reilly History:     Patient became more restless yesterday and afternoon pulling on his oxygen. Became more agitated. IV Ativan did not help with his symptoms and he was treated with IV Haldol. He was also noted to have hematuria when he voided. Bladder scan revealed 295 cc of urine. Patient was on Coumadin and his INR has been supratherapeutic for several days. He was treated with oral vitamin K 2 mg. Hematuria resolved. His INR today is 2.7. Apparently last night the patient refused his oral medications including Lopressor. Developed A. fib with RVR this morning with heart rate in 150s. Was treated with IV Lopressor and converted to normal sinus rhythm. Patient is more confused, requires more oxygen supplement. On Airvo with 90% of FiO2 sats in low 90s. Diet: ADULT DIET;  Regular; Low Fat/Low Chol/High Fiber/TO  ADULT ORAL NUTRITION SUPPLEMENT; Breakfast, Lunch, Dinner; Standard High Calorie/High Protein Oral Supplement  Medications:   Scheduled Meds:   metoprolol tartrate  25 mg Oral BID    dexamethasone  6 mg IntraVENous Q24H    warfarin (COUMADIN) daily dosing (placeholder)   Other RX Placeholder    pantoprazole  40 mg Oral QAM AC    zinc sulfate  50 mg Oral Daily    ascorbic acid  1,000 mg Oral Daily    cyproheptadine  4 mg Oral BID    Magnesium  100 mg Oral Daily    Vitamin D  2,000 Units Oral Daily    sodium chloride flush  5-40 mL IntraVENous 2 times per day    guaiFENesin  600 mg Oral BID     Continuous Infusions:   sodium chloride Stopped (01/10/22 0807)     PRN Medications: morphine, LORazepam, haloperidol lactate, hydrALAZINE, metoprolol, sodium chloride flush, sodium chloride, ondansetron **OR** ondansetron, polyethylene glycol, acetaminophen **OR** acetaminophen    Objective:   Vitals: /69   Pulse 85   Temp 97.8 °F (36.6 °C) (Axillary)   Resp 26   Ht 5' 8\" (1.727 m) Wt 164 lb 8 oz (74.6 kg)   SpO2 92%   BMI 25.01 kg/m²   BMI: Body mass index is 25.01 kg/m². CBC: No results for input(s): WBC, HGB, PLT in the last 72 hours. BMP:    Recent Labs     01/10/22  0430 01/12/22  0531    142   K 4.5 4.4    109*   CO2 23 20   BUN 35* 39*   CREATININE 1.05 1.08   GLUCOSE 158* 170*       INR:   Recent Labs     01/10/22  0430 01/11/22  0405 01/12/22  0415   INR 4.3 4.0 2.7       Physical Exam      General Appearance: Confused, somewhat restless, hard of hearing and has aphasia and very difficult to communicate  Cardiovascular: normal rate, regular rhythm, normal S1 and S2, 2/6 systolic ejection murmur   pulmonary/Chest: Bilateral rhonchi, diminished breath sounds bilaterally, no respiratory distress  Abdomen: soft, non-tender, non-distended, normal bowel sounds   Extremities: no cyanosis, clubbing or edema  Skin: warm and dry, no rash         Assessment and Plan:     1.  COVID Pneumonia - started on IV Decadron 10 mg daily  1/6/22 and increased to 20 mg on 1/8/22 secondary to the increase in oxygen requirements.  Received IV Actemra on 1/6/22.  Outside the window for Remdesivir.  Evaluated by ID and recommended to decrease Decadron to 6 mg/day 1/10. Continue on Acapella and Mucinex to help with sputum clearance.  Placed on EZPAP to help with atelectasis. Respiratory status is worsening. We'll repeat chest x-ray to follow-up on pneumonia. 2.  Acute hypoxic respiratory failure - Started out with high flow nasal cannula but advanced to Airvo on 1/8/22, requiring 90% of FiO2  3. Altered mental status, confusion -patient is on as needed Haldol. Did not do well with Ativan. We'll try IV morphine as needed to help with respiratory distress and restlessness  4.  Paroxysmal atrial fib with RVR -patient received 3 doses IV Lopressor this morning with conversion to NSR. Appreciate Dr. Zac Swain consult. Usually on Coumadin with pharmacy helping to manage dose.  Had supratherapeutic INR for many days and did not receive Coumadin. Yesterday was treated with vitamin K 2 mg due to hematuria. Continue monitoring INR. Oral Lopressor dose increased to 25 mg twice daily.    5.  H/O CVA which occurred after patient stopped coumadin. 6.  HTN - blood pressure periodically elevated.   Ordered as needed IV hydralazine  7.  BPH      CODE STATUS DNR CCA    Patient continues to require inpatient admission related to need for IV steroids, oxygen supplement, monitoring clinical condition           Electronically signed by Theodora Reyez MD on 1/12/2022 at 12:38 PM    Rounding Hospitalist

## 2022-01-12 NOTE — PROGRESS NOTES
Cardiology    1. PAF with AF and RVR with conversion to NSR with IV lopressor   2. COVID pneumonia  3. On coumadin for PAF  4. SSS with PPM  5. CVA on 4-19-15 after stopping coumadin  6. Confusion    Plan:  1. Increase Lopressor to 25 mg bid    He is confused and more agitated, which prompted AF with RVR. Converted with 3 doses of IV lopressor. There is a reported allergy to Norvasc (?), so if he has more episodes of AF with RVR, would start Digoxin 0.125 mg daily after loading with IV digoxin. He fortunately has ppm, and therefore don't need to worry about bradycardia.     Thanks, Kenneth Álvarez MD

## 2022-01-12 NOTE — PLAN OF CARE
Problem: Airway Clearance - Ineffective  Goal: Achieve or maintain patent airway  Outcome: Met This Shift     Problem: Gas Exchange - Impaired  Goal: Absence of hypoxia  Outcome: Met This Shift     Problem: Breathing Pattern - Ineffective  Goal: Ability to achieve and maintain a regular respiratory rate  Outcome: Met This Shift     Problem: Isolation Precautions - Risk of Spread of Infection  Goal: Prevent transmission of infection  Outcome: Met This Shift     Problem: Falls - Risk of:  Goal: Will remain free from falls  Description: Will remain free from falls  Outcome: Met This Shift

## 2022-01-12 NOTE — PROGRESS NOTES
Wife Dale Jorgensen called and updated with patient's condition and changes since this morning. States she still wishes for him to be placed on BiPAP if necessary. Pt turns on back and POX down to 70's. Takes long time to recover. Eventually to 89%.

## 2022-01-12 NOTE — PROGRESS NOTES
[address]    Clinical Pharmacy Note    Warfarin consult follow-up    INR (no units)   Date Value   01/12/2022 2.7   01/11/2022 4.0   01/10/2022 4.3   01/09/2022 5.3 (HH)   01/08/2022 5.5 (HH)   01/07/2022 4.2   01/06/2022 3.9       Hemoglobin (g/dL)   Date Value   01/09/2022 14.5   01/06/2022 14.4   10/06/2021 15.0     Hematocrit (%)   Date Value   01/09/2022 44.2   01/06/2022 42.7   10/06/2021 44.7     Platelet Count (k/uL)   Date Value   09/29/2011 169     Platelets (k/uL)   Date Value   01/09/2022 190   01/06/2022 113 (L)   10/06/2021 177       Target INR Range: 2-3    Significant Drug-Drug Interactions:  New warfarin drug-drug interactions: Vit K po 2 mg given 1/12  Discontinued drug-drug interactions: none      Notes: Vitamin K helped INR drop to within therapeutic range after remaining supratherapeutic despite holding daily doses for 6 days. Pt to receive home dose of 2.5 mg po warfarin this evening. Daily PT/INR until stable within therapeutic range.    Madelyn López, PharmD 1/12/2022 2:48 PM

## 2022-01-12 NOTE — PROGRESS NOTES
[facility] [address] [place] [address] Progress Note   COVID 19 Patient  Today's Date and Time: 1/11/2022, 8:02 PM    Impression :     COVID 19 Confirmed Infection  Covid tests:  1-6-22: Positive  Acute hypoxic respiratory failure    Recommendations:   Antibiotic treatment:  Monitor off antibiotics  Ceftriaxone Start date 1-7-22. Suggest D/C Rocephin  Covid Rx:    Remdesivir. Out of the window  Decadron 6 mg Stop date 1-16-22  Actemra Administered 1-7-22  Monoclonal antibodies. Out of the window      Medical Decision Making/Summary/Discussion:1/11/2022     Patient admitted with COVID 19 infection    Infection Control Recommendations   [place] Precautions  Airborne isolation  Droplet Isolation  Isolate until 1-27-22    Antimicrobial Stewardship Recommendations     Simplification of therapy  Targeted therapy  IV to oral conversion  PK dosing  Restricted antimicrobial use  Discontinuation of therapy  Coordination of Outpatient Care:   Estimated Length of IV antimicrobials:TBD  Patient will need Midline Catheter Insertion: TBD  Patient will need PICC line Insertion: No  Patient will need: Home IV , [place],  SNF,  LTAC:TBD  Patient will need outpatient wound care:No    Chief complaint/reason for consultation:   Concern for COVID infection      History of Present Illness:   Jose Maria Can is a 80y.o.-year-old  male who was initially admitted on 1/6/2022. Patient seen at the request of . INITIAL HISTORY:    Patient presented through ER with complaints of increasing shortness of breath, cough and malaise. He indicated he had been exposed to his daughter who had developed Covid around 12-16-21. He then developed early onset of symptoms before [place]. Progressive symptoms ensued along with loss of appetite and one episode of diarrhea. On 1-6-22 his 02 sat at home dropped to 73% leading to his visit to the ER where his 02 sat on room air was 64%.  He required Transportation (Non-Medical): Not on file   Physical Activity:     Days of Exercise per Week: Not on file    Minutes of Exercise per Session: Not on file   Stress:     Feeling of Stress : Not on file   Social Connections:     Frequency of Communication with Friends and Family: Not on file    Frequency of Social Gatherings with Friends and Family: Not on file    Attends [employer] Services: Not on file    Active Member of [address] [place] [address] [employer] Private or Organizations: Not on file    Attends Club or Organization Meetings: Not on file    Marital Status: Not on file   Intimate Partner Violence:     Fear of Current or Ex-Partner: Not on file    Emotionally Abused: Not on file    Physically Abused: Not on file    Sexually Abused: Not on file   Housing Stability:     Unable to Pay for Housing in the Last Year: Not on file    Number of [place] in the Last Year: Not on file    Unstable Housing in the Last Year: Not on file       Family History:     Family History   Problem Relation Age of Onset    Cancer Mother 76        lung ca    Cancer Father 70        bowel ca    Cancer Sister     Cancer Brother         Allergies:   Amlodipine, Ciprofloxacin, Naprosyn [naproxen], Other, Lisinopril, and Red dye     Review of Systems:       Constitutional: No fevers or chills. Malaise  Head: No headaches  Eyes: No double vision or blurry vision. No conjunctival inflammation. ENT: No sore throat or runny nose. . No hearing loss, tinnitus or vertigo. Aphasia  Cardiovascular: No chest pain or palpitations. Shortness of breath. PULIDO  Lung: Shortness of breath, dry cough. No sputum production  Abdomen: No nausea, vomiting. Had  Diarrhea x 1. No abdominal pain. Cristine Colder No cramps. Genitourinary: No increased urinary frequency, or dysuria. No hematuria. No suprapubic or CVA pain  Musculoskeletal: No muscle aches or pains. No joint effusions, swelling or deformities. Reports muscle weakness  Hematologic: No bleeding or bruising.   Neurologic: No headache, weakness, numbness, or tingling. Integument: No rash, no ulcers. Psychiatric: No depression. Endocrine: No polyuria, no polydipsia, no polyphagia. Physical Examination :     Patient Vitals for the past 8 hrs:   BP Temp Temp src Pulse Resp SpO2   01/11/22 1959 (!) 97/45 98.7 °F (37.1 °C) Oral 102 22 100 %   01/11/22 1404 -- -- -- -- -- 99 %   01/11/22 1335 -- -- -- -- -- 92 %   01/11/22 1330 -- -- -- -- -- (!) 80 %     General Appearance: Awake, alert, and in no apparent distress  Head:  Normocephalic, no trauma  Eyes: Pupils equal, round, reactive to light; sclera anicteric; conjunctivae pink. No embolic phenomena. ENT: Oropharynx clear, without erythema, exudate, or thrush. No tenderness of sinuses. Mouth/throat: mucosa pink and moist. No lesions. Dentition in good repair. Neck:Supple, without lymphadenopathy. Thyroid normal, No bruits. Pulmonary/Chest: Decreased breath sounds with posterior rales   Cardiovascular: Regular rate and rhythm with Grade 2/6 LYUDMILA, no rubs, or gallops. Abdomen: Soft, non tender. Bowel sounds normal. No organomegaly  All four Extremities: No cyanosis, clubbing, edema, or effusions. Neurologic: No gross sensory or motor deficits. Aphasia  Skin: Warm and dry with good turgor. Signs of peripheral arterial venous insufficiency. No ulcerations. No open wounds. Medical Decision Making -Laboratory:   I have independently reviewed/ordered the following labs:    CBC with Differential:   Recent Labs     01/09/22 0415   WBC 9.9   HGB 14.5   HCT 44.2      LYMPHOPCT        MONOPCT          BMP:   Recent Labs     01/09/22 0415 01/10/22  0430   * 141   K 4.3 4.5   * 107   CO2 24 23   BUN 38* 35*   CREATININE 1.06 1.05     Hepatic Function Panel:   Recent Labs     01/09/22 0415   PROT 6.7   LABALBU 3.0*   BILITOT 0.57   ALKPHOS 44   ALT 24   AST 41*     No results for input(s): RPR in the last 72 hours. No results for input(s): HIV in the last 72 hours.   No results for input(s): BC in the last 72 hours. Lab Results   Component Value Date    MUCUS NOT REPORTED 01/07/2022    RBC 5.05 01/09/2022    RBC 5.34 09/29/2011    TRICHOMONAS NOT REPORTED 01/07/2022    WBC 9.9 01/09/2022    YEAST NOT REPORTED 01/07/2022    TURBIDITY Hazy 01/07/2022     Lab Results   Component Value Date    CREATININE 1.05 01/10/2022    GLUCOSE 158 01/10/2022    GLUCOSE 96 09/29/2011       Medical Decision Making-Imaging:     EXAM: XR CHEST PORTABLE        HISTORY: Reason for exam:->Hypoxia, suspected COVID-19 pneumonia       COMPARISON: Two-view chest from 10/6/2021.            TECHNIQUE: Portable chest was done at 8:11 PM.           FINDINGS:        Multilead cardiac pacer is noted. Trachea is midline. Mediastinum is not    widened. Heart size is upper limits of normal.       No pneumothorax or nodule or effusion is noted. Slight scattered patchy    haziness is noted in the middle and lower lung zones bilaterally.       Diaphragm is intact. The bony elements show mild osteopenic changes. Postsurgical changes are noted of the right shoulder.                   Impression           1. Slight scattered patchy haziness and markings are noted in the middle and    lower lung zones bilaterally which may represent early multifocal pneumonia or    perhaps slight pulmonary edema/pneumonitis. Medical Decision Apskbo-Zsquchxi-[place]:       Medical Decision Making-Other:     Note:  Labs, medications, radiologic studies were reviewed with personal review of films  Large amounts of data were reviewed  Discussed with nursing Staff, Discharge planner  Infection Control and Prevention measures reviewed  All prior entries were reviewed  Administer medications as ordered  Prognosis: Guarded  Discharge planning reviewed  Follow up as outpatient. Thank you for allowing us to participate in the care of this patient. Please call with questions.     Danya Cunningham MD  Pager: [phone] - Office: [phone]

## 2022-01-12 NOTE — PROGRESS NOTES
Spoke again with wife. Reiterates that all 3 of Diego's children wish for him to be on Bi-PAP if necessary. Pt also updated that wife calls in several times a day to check on him.

## 2022-01-12 NOTE — PROGRESS NOTES
Pt received haldol IM as ordered throughout night. Did not sleep well. Confused. Agitated. Attempting to get out of bed a couple of times. Asks a handful of times \"wheres my wife\" when told she went home, pt states \"that's where I should be, let me go. Just let me die:\" reassurance given. Repositioned numerous times, along with monitors reapplied. sats variable throughout night on airvo. Also at various times during the night pt removed airvo and sats immediately drop to the 70's on RA. Recovered nicely when reapplied. This am pt monitor reading afib with rvr/SVT. Dr. Arsenio Madden made aware. Consult called to Dr. Sudhir Hernández, new orders received. Additional dose of IV lopressor given with eventual rate decrease and conversion to NSR. See posted strips. Dr. Sudhir Hernández in to see pt and EKG obtained per order showing paced rhythm.  Gave report and sitter at bedside

## 2022-01-12 NOTE — PROGRESS NOTES
Sitter has remained a bedside, assists pt when urinal when needed. POX runs low 90's unless with activity, then drops mid 80's, down to low 80's during CXR.

## 2022-01-12 NOTE — PROGRESS NOTES
Pt remains confused and pulling at leads and oxygen. Pt incontinent of urine. Repositioned, given oral care. Bed alarm on and sitter in room.   Pt is on heated high flow

## 2022-01-13 PROBLEM — U07.1 ACUTE RESPIRATORY FAILURE DUE TO COVID-19 (HCC): Status: ACTIVE | Noted: 2022-01-01

## 2022-01-13 PROBLEM — J96.00 ACUTE RESPIRATORY FAILURE DUE TO COVID-19 (HCC): Status: ACTIVE | Noted: 2022-01-01

## 2022-01-13 NOTE — DISCHARGE SUMMARY
Hospitalist Discharge Summary    Patient:  Williams Mi  YOB: 1940    MRN: [id number]   Acct: [de-identified]    Primary Care Physician: Kayley Reid date:  1/6/2022    Discharge date:  1/13/2022       Discharge Diagnoses:     1. COVID 19 pneumonia  2. Acute hypoxemic respiratory failure  3. Altered mental status  4. Paroxysmal atrial fibrillation with RVR  5. History of CVA  6. Aphasia  7. Hypertension  8. Hematuria, resolved  9. BPH    Discharge Medications:         Medication List      ASK your doctor about these medications    ascorbic acid 500 MG tablet  Commonly known as: VITAMIN C     B COMPLEX 1 PO     CO Q 10 PO     cyproheptadine 4 MG tablet  Commonly known as: PERIACTIN  Take 1 tablet by mouth 2 times daily     LECITHIN PO     LUTEIN PO     Magnesium 100 MG Tabs     metoprolol tartrate 25 MG tablet  Commonly known as: LOPRESSOR  TAKE 1/2 TABLET TWICE A DAY  Ask about: Which instructions should I use? NONFORMULARY     Omega-3 Fish Oil 1200 MG Caps     saw palmetto 160 MG capsule     vitamin D 1000 UNIT Tabs tablet  Commonly known as: CHOLECALCIFEROL     Vitamin E-Selenium 400-50 UNIT-MCG Caps     warfarin 5 MG tablet  Commonly known as: COUMADIN  Take as directed. If you are unsure how to take this medication, talk to your nurse or doctor. Original instructions: TAKE 1/2 tablet on Wednesdays and Sundays and take 1 TABLET daily all other days of the week or as directed by [facility].      Zinc Gluconate 15 MG Tabs           Where to Get Your Medications      These medications were sent to [facility] [address]    Phone: [phone]   · metoprolol tartrate 25 MG tablet         Diet:  No diet orders on file    Activity: Bedrest    Follow-up: Follow-up with hospice team and hospitalist        Physical Exam:    Vitals:  Patient Vitals for the past 24 hrs:   BP Temp Temp src Pulse Resp SpO2   01/13/22 1145 -- -- -- -- 21 --   01/13/22 1130 106/64 98.2 °F (36.8 °C) Axillary 90 -- 93 %   01/13/22 0952 -- -- -- -- 28 91 %   01/13/22 0950 -- -- -- -- 28 97 %   01/13/22 0700 116/66 98.5 °F (36.9 °C) Axillary 70 25 (!) 88 %   01/13/22 0431 -- -- -- -- 30 93 %   01/13/22 0430 (!) 145/78 97.9 °F (36.6 °C) Axillary 81 21 95 %   01/13/22 0140 -- -- -- -- -- 96 %   01/13/22 0130 -- -- -- -- -- (!) 85 %   01/13/22 0025 -- -- -- -- (!) 37 94 %   01/13/22 0002 115/67 97.6 °F (36.4 °C) Axillary 73 28 93 %   01/12/22 2305 -- -- -- -- (!) 31 --   01/12/22 2024 127/69 97.9 °F (36.6 °C) Axillary 81 26 94 %   01/12/22 1745 -- -- -- -- 27 95 %     Weight: Weight: 164 lb 8 oz (74.6 kg)     24 hour intake/output:    Intake/Output Summary (Last 24 hours) at 1/13/2022 1618  Last data filed at 1/13/2022 1347  Gross per 24 hour   Intake 850 ml   Output 650 ml   Net 200 ml       General Appearance: Confused,hard of hearing, on BiPAP, mild to moderate distress  Cardiovascular: normal rate, regular rhythm, normal S1 and S2, 2/6 systolic ejection murmur   pulmonary/Chest: Bilateral rhonchi, diminished breath sounds bilaterally  Abdomen: soft, non-tender, non-distended, normal bowel sounds   Extremities: no cyanosis, clubbing or edema  Skin: warm and dry, no rash        Hospital Course: The patient is a 80 y.o. male who presents to the ER with increasing SOB. According to Syeda Pink (who gives a limited history secondary to being aphasic) and EMR, he started with symptoms just before Ailyn with cough, malaise, and shortness of breath. He developed symptoms after he was exposed to his daughter who contracted COVID 3 weeks ago. Over the next several days his breathing continued to worsen. The cough has been primarily dry with occasional sputum production. He had one episode of diarrhea. Appetite has not been as good, no nausea.   He denies any increased swelling in the legs or leg pain. He was not taking any medication for his symptoms. Vaishali Castañeda had been monitoring his oxygen saturation at home with his SPO2 dropping to 73% . He was brought to the ER for further evaluation. His oxygen saturation was 64% on room in the ER. This improved with high flow nasal cannula. CMP was normal other than a glucose of 151 and an AST of 57. CBC was normal other than a platelet count of 411. COVID was positive. CXR showed  Slight scattered patchy haziness and markings are noted in the middle and    lower lung zones bilaterally which may represent early multifocal pneumonia or    perhaps slight pulmonary edema/pneumonitis.      Vaishali Castañeda is admitted for IV Decadron, Oxygen support with respiratory treatments, and close monitoring of condition.         Patient's hospital course as follows:    1.  COVID Pneumonia -worsening clinically and her chest x-ray. Started on IV Decadron 10 mg daily  1/6/22 and increased to 20 mg on 1/8/22 secondary to the increase in oxygen requirements.  Received IV Actemra on 1/6/22.  Outside the window for Remdesivir.  Evaluated by ID  and recommended to decrease Decadron to 6 mg/day 1/10. 2.  Acute hypoxic respiratory failure - Started out with high flow nasal cannula but advanced to Airvo on 1/8/22, requiring 90% of FiO2. Using BiPAP with FiO2 90%. Patient is DNR CCA. Patient's worsening respiratory status was discussed with family. At this time is becoming very difficult to oxygenate him with BiPAP. In addition, he is fighting BiPAP and requires sedation which makes him more confused. Family members wish to keep the patient comfortable and were agreeable for hospice care. Hospice team was consulted and evaluated the patient. Patient was admitted to hospice service inpatient. 3. Altered mental status, confusion -patient is on as needed Haldol and morphine. Did not do well with Ativan.    4.  Paroxysmal atrial fib with RVR -patient received 3 doses IV Lopressor on 1/12 with conversion to NSR. Lopressor 25 mg twice daily Appreciate Dr. Zahra Wallace consult. Continue on Coumadin with pharmacy helping to manage dose. Had supratherapeutic INR for many days and did not receive Coumadin. On 1/11  was treated with po vitamin K 2 mg due to hematuria. Continue monitoring INR. 5.  H/O CVA which occurred after patient stopped coumadin.    6.  HTN - blood pressure periodically elevated.  Ordered as needed IV hydralazine  7.  BPH       Disposition: Inpatient hospice    Condition: Unstable    Time Spent: 33 minutes      Electronically signed by Stephanie Mancini MD on 1/13/2022 at 4:18 PM  Discharging Hospitalist

## 2022-01-13 NOTE — DEATH NOTES
Patient is unresponsive. Patient is pulseless. No spontaneous respirations. Pupils are dilated bilaterally nonreactive. Patient is pronounced dead on 2022 at 6:15 PM.  The cause is natural death due to COVID infection. The body can be released to the  home of choice.

## 2022-01-13 NOTE — PROGRESS NOTES
[facility] Progress Note   COVID 19 Patient  Today's Date and Time: 1/12/2022, 7:18 PM    Impression :     · COVID 19 Confirmed Infection  · Covid tests:  · 1-6-22: Positive  · Acute hypoxic respiratory failure    Recommendations:   · Antibiotic treatment:  · Monitor off antibiotics  · Covid Rx:    · Remdesivir. Out of the window  · Decadron 6 mg Stop date 1-16-22  · Actemra Administered 1-7-22  · Monoclonal antibodies. Out of the window      Medical Decision Making/Summary/Discussion:1/12/2022     · Patient admitted with COVID 19 infection    Infection Control Recommendations   · Universal Precautions  · Airborne isolation  · Droplet Isolation  · Isolate until 1-27-22    Antimicrobial Stewardship Recommendations     · Discontinuation of therapy  Coordination of Outpatient Care:   · Estimated Length of IV antimicrobials:1-11-22  · Patient will need Midline Catheter Insertion: TBD  · Patient will need PICC line Insertion: No  · Patient will need: Home IV , [place],  SNF,  LTAC:TBD  · Patient will need outpatient wound care:No    Chief complaint/reason for consultation:   · Concern for COVID infection      History of Present Illness:   Kunal Beatty is a 80y.o.-year-old  male who was initially admitted on 1/6/2022. Patient seen at the request of . INITIAL HISTORY:    Patient presented through ER with complaints of increasing shortness of breath, cough and malaise. He indicated he had been exposed to his daughter who had developed Covid around 12-16-21. He then developed early onset of symptoms before Ailyn. Progressive symptoms ensued along with loss of appetite and one episode of diarrhea. On 1-6-22 his 02 sat at home dropped to 73% leading to his visit to the ER where his 02 sat on room air was 64%. He required high flow 02. His Covid test was positive on 1-6-22.    CX showed multifocal pneumonia    Patient admitted because of concerns with COVID 19 pneumonia and hypoxia    CURRENT EVALUATION : 1/12/2022    Afebrile  VS stable      Patient exhibiting respiratory distress. yes  Respiratory secretions: no    Patient receiving supplemental oxygen. High flow  Flow rate 60 L/min  Fi02 87-->93  RR 28-->22  02 sat 93-->100-->90      NEWS Score: 0-4 Low risk group; 5-6: Medium risk group; 7 or above: High risk group  Parameters 3 2 1 0 1 2 3   Age    < 65   ? 65   RR ? 8  9-11 12-20  21-24 ? 25   O2 Sats ? 91 92-93 94-95 ? 96      Suppl O2  Yes  No      SBP ? 90  101-110 111-219   ? 220   HR ? 40  41-50 51-90  111-130 ? 131   Consciousness    Alert   Drowsiness, lethargy, or confusion   Temperature ? 35.0 C (95.0 F)  35.1-36.0 C 95.1-96.9 F 36.1-38.0 C 97.0-100.4 F 38.1-39.0 C 100.5-102.3 F ? 39.1 C ? 102.4 F      NEWS Score:   1-9-22: 12 high risk    Overall Daily Picture:      Worsening    Presence of secondary bacterial Infection:    No   Additional antibiotics: no    Labs, X rays reviewed: 1/12/2022    BUN: 38-->35-->39  Cr: 1.08    WBC: 9.9  Hb: 14.5  Plat: 190    Absolute Neutrophils: 4.2  Absolute Lymphocytes: 0.4  Neutrophil/Lymphocyte Ratio: 10 High Risk    CRP: 132-->74-->32.2-->9.2  Ferritin: 920  LDH:     Pro Calcitonin:      Cultures:  Urine:  ·   Blood:  ·   Sputum :  ·   Wound:       CXR:   · 1-6-22: Early multifocal pneumonia  CAT:      Discussed with RNAUDELIA.    1-6-22    I have personally reviewed the past medical history, past surgical history, medications, social history, and family history, and I have updated the database accordingly.   Past Medical History:     Past Medical History:   Diagnosis Date    Arthritis     Atrial fibrillation ([address]     CAD (coronary artery disease)     Hard of hearing     Hx of blood clots     right arm after iv    Hyperlipidemia     Hypertension     Kidney stones     Pacemaker 11/20/2013    medtronic    Pneumonia     Right bundle branch block     Unspecified cerebral artery occlusion with cerebral infarction     with aphasia       Past Surgical  History:     Past Surgical History:   Procedure Laterality Date    CATARACT REMOVAL WITH IMPLANT Left 13    CATARACT REMOVAL WITH IMPLANT Right 13    COLONOSCOPY      EYE SURGERY      [facility]ERNIA [facility]PAIR      [facility]    KIDNEY STONE SURGERY      [id number]    PACEMAKER PLACEMENT Left 13    SHOULDER SURGERY      [facility] [place]       Medications:      metoprolol tartrate  25 mg Oral BID    dexamethasone  6 mg IntraVENous Q24H    warfarin (COUMADIN) daily dosing (placeholder)   Other RX Placeholder    pantoprazole  40 mg Oral QAM AC    zinc sulfate  50 mg Oral Daily    ascorbic acid  1,000 mg Oral Daily    cyproheptadine  4 mg Oral BID    Magnesium  100 mg Oral Daily    Vitamin D  2,000 Units Oral Daily    sodium chloride flush  5-40 mL IntraVENous 2 times per day    guaiFENesin  600 mg Oral BID       Social History:     Social History     Socioeconomic History    Marital status:      Spouse name: Michelle Gaytan Number of children: 3    Years of education: 25    Highest education level: Not on file   Occupational History    Not on file   Tobacco Use    Smoking status: Former Smoker     Quit date: 1982     Years since quittin.1    Smokeless tobacco: Never Used   Substance and Sexual Activity    Alcohol use: No    Drug use: No    Sexual activity: Not on file   Other Topics Concern    Not on file   Social History Narrative    Not on file     Social Determinants of Health     Financial Resource Strain:     Difficulty of Paying Living Expenses: Not on file   Food Insecurity:     Worried About Running Out of Food in the Last Year: Not on file    Xiomy of Food in the Last Year: Not on file   Transportation Needs:     Lack of Transportation (Medical): Not on file    Lack of Transportation (Non-Medical):  Not on file   Physical Activity:     Days of Exercise per Week: Not on file   [employer] of Exercise per Session: Not on file   Stress:     Feeling of Stress : Not on file   Social Connections:     Frequency of Communication with Friends and Family: Not on file    Frequency of Social Gatherings with Friends and Family: Not on file    Attends [employer] Services: Not on file    Active Member of [address] [place] [address] [employer] [address] or Organizations: Not on file    Attends Club or Organization Meetings: Not on file    Marital Status: Not on file   Intimate Partner Violence:     Fear of Current or Ex-Partner: Not on file    Emotionally Abused: Not on file    Physically Abused: Not on file    Sexually Abused: Not on file   Housing Stability:     Unable to Pay for Housing in the Last Year: Not on file    Number of [place] in the Last Year: Not on file    Unstable Housing in the Last Year: Not on file       Family History:     Family History   Problem Relation Age of Onset    Cancer Mother 76        lung ca    Cancer Father 70        bowel ca    Cancer Sister     Cancer Brother         Allergies:   Amlodipine, Ciprofloxacin, Naprosyn [naproxen], Other, Lisinopril, and Red dye     Review of Systems:       Constitutional: No fevers or chills. Malaise  Head: No headaches  Eyes: No double vision or blurry vision. No conjunctival inflammation. ENT: No sore throat or runny nose. . No hearing loss, tinnitus or vertigo. Aphasia  Cardiovascular: No chest pain or palpitations. Shortness of breath. PULIDO  Lung: Shortness of breath, dry cough. No sputum production  Abdomen: No nausea, vomiting. Had  Diarrhea x 1. No abdominal pain. [place]borne [place]uet No cramps. Genitourinary: No increased urinary frequency, or dysuria. No hematuria. No suprapubic or CVA pain  Musculoskeletal: No muscle aches or pains. No joint effusions, swelling or deformities. Reports muscle weakness  Hematologic: No bleeding or bruising. Neurologic: No headache, weakness, numbness, or tingling. Integument: No rash, no ulcers. Psychiatric: No depression. Endocrine: No polyuria, no polydipsia, no polyphagia. Physical Examination :     Patient Vitals for the past 8 hrs:   BP Temp Temp src Pulse Resp SpO2   01/12/22 1600 (!) 131/44 -- -- 89 -- 90 %   01/12/22 1523 -- 97.7 °F (36.5 °C) Axillary 71 22 95 %   01/12/22 1200 137/69 97.8 °F (36.6 °C) Axillary 85 -- --     General Appearance: Awake, alert, and in no apparent distress  Head:  Normocephalic, no trauma  Eyes: Pupils equal, round, reactive to light; sclera anicteric; conjunctivae pink. No embolic phenomena. ENT: Oropharynx clear, without erythema, exudate, or thrush. No tenderness of sinuses. Mouth/throat: mucosa pink and moist. No lesions. Dentition in good repair. Neck:Supple, without lymphadenopathy. Thyroid normal, No bruits. Pulmonary/Chest: Decreased breath sounds with posterior rales   Cardiovascular: Regular rate and rhythm with Grade 2/6 LYUDMILA, no rubs, or gallops. Abdomen: Soft, non tender. Bowel sounds normal. No organomegaly  All four Extremities: No cyanosis, clubbing, edema, or effusions. Neurologic: No gross sensory or motor deficits. Aphasia  Skin: Warm and dry with good turgor. Signs of peripheral arterial venous insufficiency. No ulcerations. No open wounds. Medical Decision Making -Laboratory:   I have independently reviewed/ordered the following labs:    CBC with Differential:   No results for input(s): WBC, HGB, HCT, PLT, SEGSPCT, BANDSPCT, LYMPHOPCT, MONOPCT, EOSPCT in the last 72 hours. BMP:   Recent Labs     01/10/22  0430 01/12/22  0531    142   K 4.5 4.4    109*   CO2 23 20   BUN 35* 39*   CREATININE 1.05 1.08     Hepatic Function Panel:   No results for input(s): PROT, LABALBU, BILIDIR, IBILI, BILITOT, ALKPHOS, ALT, AST in the last 72 hours. No results for input(s): RPR in the last 72 hours. No results for input(s): HIV in the last 72 hours. No results for input(s): BC in the last 72 hours.   Lab Results   Component Value Date    MUCUS NOT REPORTED 01/07/2022 RBC 5.05 01/09/2022    RBC 5.34 09/29/2011    TRICHOMONAS NOT REPORTED 01/07/2022    WBC 9.9 01/09/2022    YEAST NOT REPORTED 01/07/2022    TURBIDITY Hazy 01/07/2022     Lab Results   Component Value Date    CREATININE 1.08 01/12/2022    GLUCOSE 170 01/12/2022    GLUCOSE 96 09/29/2011       Medical Decision Making-Imaging:     EXAM: XR CHEST PORTABLE        HISTORY: Reason for exam:->Hypoxia, suspected COVID-19 pneumonia       COMPARISON: Two-view chest from 10/6/2021.            TECHNIQUE: Portable chest was done at 8:11 PM.           FINDINGS:        Multilead cardiac pacer is noted. Trachea is midline. Mediastinum is not    widened. Heart size is upper limits of normal.       No pneumothorax or nodule or effusion is noted. Slight scattered patchy    haziness is noted in the middle and lower lung zones bilaterally.       Diaphragm is intact. The bony elements show mild osteopenic changes. Postsurgical changes are noted of the right shoulder.                   Impression           1. Slight scattered patchy haziness and markings are noted in the middle and    lower lung zones bilaterally which may represent early multifocal pneumonia or    perhaps slight pulmonary edema/pneumonitis. Medical Decision Qywksn-Bqehhxwh-Mibyo:       Medical Decision Making-Other:     Note:  · Labs, medications, radiologic studies were reviewed with personal review of films  · Large amounts of data were reviewed  · Discussed with nursing Staff, Discharge planner  · Infection Control and Prevention measures reviewed  · All prior entries were reviewed  · Administer medications as ordered  · Prognosis: Guarded  · Discharge planning reviewed  · Follow up as outpatient. Thank you for allowing us to participate in the care of this patient. Please call with questions.     Uri Smith MD  Pager: [phone] - Office: [phone]

## 2022-01-13 NOTE — PROGRESS NOTES
Complete bath and linen change at this time. Tolerates with out complaint. Repositioned onto his right side. Pulls at [facility]. He is agreeable to leave HHF in place until his family visits this afternoon. States \"I hope they are coming to see me. \" Reassurance provided. Denies further needs. Sitter remains in room.

## 2022-01-13 NOTE — PROGRESS NOTES
Pulls at [facility]. Repositioned for comfort. Lunch ordered. Assisted to void. Morphine IV given for restlessness.

## 2022-01-13 NOTE — PROGRESS NOTES
Multiple family members in at bedside. Family wishes to keep patient comfortable and are asking for morphine to be given every hour as it is ordered. Pt asks for HHF to be removed and to be placed on cannula. RT aware and to be up. Ice bags given to pt for his feet that he c/o are \"hot\". Support given to family.

## 2022-01-13 NOTE — PROGRESS NOTES
Hospitalist Progress Note  1/13/2022 6:55 AM  Subjective:   Admit Date: 1/6/2022  PCP: Lesli Mijares    Interval History:       The patient is hard of hearing and has aphasia, communication is extremely difficult. In addition, he is sedated due to episodes of agitation. On BiPAP this morning. Patient O2 requirement is increasing. Chest x-ray from yesterday 1/12 revealed moderate worsening of pneumonia. Patient developed episodes of restlessness and agitations, pulling on IVs and BiPAP. Diet: ADULT DIET; Regular; Low Fat/Low Chol/High Fiber/TO  ADULT ORAL NUTRITION SUPPLEMENT; Breakfast, Lunch, Dinner; Standard High Calorie/High Protein Oral Supplement  Medications:   Scheduled Meds:   metoprolol tartrate  25 mg Oral BID    dexamethasone  6 mg IntraVENous Q24H    warfarin (COUMADIN) daily dosing (placeholder)   Other RX Placeholder    pantoprazole  40 mg Oral QAM AC    zinc sulfate  50 mg Oral Daily    ascorbic acid  1,000 mg Oral Daily    cyproheptadine  4 mg Oral BID    Magnesium  100 mg Oral Daily    Vitamin D  2,000 Units Oral Daily    sodium chloride flush  5-40 mL IntraVENous 2 times per day    guaiFENesin  600 mg Oral BID     Continuous Infusions:   sodium chloride Stopped (01/10/22 0807)     PRN Medications: morphine, LORazepam, haloperidol lactate, hydrALAZINE, metoprolol, sodium chloride flush, sodium chloride, ondansetron **OR** ondansetron, polyethylene glycol, acetaminophen **OR** acetaminophen    Objective:   Vitals: BP (!) 145/78   Pulse 81   Temp 97.9 °F (36.6 °C) (Axillary)   Resp 30   Ht 5' 8\" (1.727 m)   Wt 164 lb 8 oz (74.6 kg)   SpO2 93%   BMI 25.01 kg/m²   BMI: Body mass index is 25.01 kg/m². CBC: No results for input(s): WBC, HGB, PLT in the last 72 hours.   BMP:    Recent Labs     01/12/22  0531      K 4.4   *   CO2 20   BUN 39*   CREATININE 1.08   GLUCOSE 170*     INR:   Recent Labs     01/11/22  0405 01/12/22  0415 01/13/22  0400   INR 4.0 2.7 1.8 Physical Exam:          General Appearance: Confused,hard of hearing, on BiPAP, mild to moderate distress  Cardiovascular: normal rate, regular rhythm, normal S1 and S2, 2/6 systolic ejection murmur   pulmonary/Chest: Bilateral rhonchi, diminished breath sounds bilaterally  Abdomen: soft, non-tender, non-distended, normal bowel sounds   Extremities: no cyanosis, clubbing or edema  Skin: warm and dry, no rash     Assessment and Plan:       1.  COVID Pneumonia -worsening clinically and her chest x-ray. Started on IV Decadron 10 mg daily  1/6/22 and increased to 20 mg on 1/8/22 secondary to the increase in oxygen requirements.  Received IV Actemra on 1/6/22.  Outside the window for Remdesivir.  Evaluated by ID  and recommended to decrease Decadron to 6 mg/day 1/10. ID help is greatly appreciated. 2.  Acute hypoxic respiratory failure - Started out with high flow nasal cannula but advanced to Airvo on 1/8/22, requiring 90% of FiO2. Using BiPAP with FiO2 90%. Patient is DNR CCA. Will discuss with family worsening clinical condition. 3. Altered mental status, confusion -patient is on as needed Haldol and morphine. Did not do well with Ativan. 4.  Paroxysmal atrial fib with RVR -patient received 3 doses IV Lopressor on 1/12 with conversion to NSR. Lopressor 25 mg twice daily Appreciate Dr. Mattie Hung consult. Continue on Coumadin with pharmacy helping to manage dose. Had supratherapeutic INR for many days and did not receive Coumadin. On 1/11  was treated with po vitamin K 2 mg due to hematuria. Continue monitoring INR. 5.  H/O CVA which occurred after patient stopped coumadin.    6.  HTN - blood pressure periodically elevated.  Ordered as needed IV hydralazine  7.  BPH          Patient continues to require inpatient admission related to need for IV steroids, oxygen supplement, monitoring clinical condition            Electronically signed by Sunil Tian MD on 1/13/2022 at 6:55 AM    Rounding Hospitalist

## 2022-01-13 NOTE — PLAN OF CARE
Problem: Airway Clearance - Ineffective  Goal: Achieve or maintain patent airway  Outcome: Ongoing     Problem: Gas Exchange - Impaired  Goal: Absence of hypoxia  Outcome: Ongoing     Problem: Breathing Pattern - Ineffective  Goal: Ability to achieve and maintain a regular respiratory rate  1/13/2022 0343 by Bonnie Carbajal RN  Outcome: Ongoing  1/12/2022 1417 by Ash Vicente RN  Outcome: Ongoing     Problem:  Body Temperature -  Risk of, Imbalanced  Goal: Ability to maintain a body temperature within defined limits  Outcome: Ongoing     Problem: Nutrition Deficits  Goal: Optimize nutritional status  Outcome: Ongoing     Problem: Risk for Fluid Volume Deficit  Goal: Maintain normal heart rhythm  Outcome: Ongoing     Problem: Falls - Risk of:  Goal: Will remain free from falls  Description: Will remain free from falls  Outcome: Met This Shift     Problem: Skin Integrity:  Goal: Will show no infection signs and symptoms  Description: Will show no infection signs and symptoms  Outcome: Ongoing

## 2022-01-13 NOTE — H&P
History & Physical    Patient:  Pravin Baker  YOB: 1940  Date of Service: 1/13/2022  MRN: [id number]   Acct:   [de-identified]   Primary Care Physician: Jacky Arreola    Chief Complaint: SOB    History of Present Illness: The patient is a 80 y. o. male who presents to the ER with increasing SOB.   According to Kevin Almaraz (who gives a limited history secondary to being aphasic) and EMR, he started with symptoms just before Ailyn with cough, malaise, and shortness of breath.  He developed symptoms after he was exposed to his daughter who contracted COVID 3 weeks ago. Lexie Ferraris the next several days his breathing continued to worsen.  The cough has been primarily dry with occasional sputum production.  He had one episode of diarrhea.  Appetite has not been as good, no nausea.  He denies any increased swelling in the legs or leg pain.  He was not taking any medication for his symptoms. Kevin Almaraz had been monitoring his oxygen saturation at home with his SPO2 dropping to 73% .   He was brought to the ER for further evaluation.    His oxygen saturation was 64% on room in the ER.  This improved with high flow nasal cannula. CMP was normal other than a glucose of 151 and an AST of 57.  CBC was normal other than a platelet count of 914.  COVID was positive.  CXR showed  Slight scattered patchy haziness and markings are noted in the middle and    lower lung zones bilaterally which may represent early multifocal pneumonia or    perhaps slight pulmonary edema/pneumonitis.      Kevin Almaraz was admitted for IV Decadron, Oxygen support with respiratory treatments, and close monitoring of condition. Patient started out with high flow nasal cannula but advanced to Airvo on 1/8/22, requiring 90% of FiO2. Patient was transitioned to  BiPAP with FiO2 90% and continued to remain hypoxic.   Patient is DNR CCA.    Patient's worsening respiratory status was discussed with family.   At this time is becoming very difficult to oxygenate him with BiPAP. In addition, he is fighting BiPAP and requires sedation which makes him more confused on BiPAP. Family members wish to keep the patient comfortable and were agreeable for hospice care. Hospice team was consulted and evaluated the patient. Patient was admitted to hospice  inpatient. Past Medical History:        Diagnosis Date    Arthritis     Atrial fibrillation ([address]     CAD (coronary artery disease)     Hard of hearing     Hx of blood clots     right arm after iv    Hyperlipidemia     Hypertension     Kidney stones     Pacemaker 11/20/2013    medtronic    Pneumonia     Right bundle branch block     Unspecified cerebral artery occlusion with cerebral infarction     with aphasia       Past Surgical History:        Procedure Laterality Date    CATARACT REMOVAL WITH IMPLANT Left 7-8-13    CATARACT REMOVAL WITH IMPLANT Right 08/05/13    COLONOSCOPY      EYE SURGERY      [facility]ERN[facility]PA[facility]  2009    [facility]    KIDNEY STONE SURGERY      [id number]    PACEMAKER PLACEMENT Left 11/20/13    SHOULDER SURGERY  2002    [facility] Medications:   No current facility-administered medications on file prior to encounter. Current Outpatient Medications on File Prior to Encounter   Medication Sig Dispense Refill    metoprolol tartrate (LOPRESSOR) 25 MG tablet TAKE 1/2 TABLET TWICE A DAY 90 tablet 3    cyproheptadine (PERIACTIN) 4 MG tablet Take 1 tablet by mouth 2 times daily 60 tablet 3    warfarin (COUMADIN) 5 MG tablet TAKE 1/2 tablet on Wednesdays and Sundays and take 1 TABLET daily all other days of the week or as directed by [facility] Medication Management.  90 tablet 3    vitamin D (CHOLECALCIFEROL) 1000 UNIT TABS tablet Take 2,000 Units by mouth daily       Zinc Gluconate 15 MG TABS Take 1 tablet by mouth daily       LUTEIN PO Take 5 mg by mouth daily      LECITHIN PO Take 6.2 g by mouth daily 3.1 grams per tablet      Coenzyme Q10 (CO Q 10 PO) Take 100 mg by mouth daily       ascorbic acid (VITAMIN C) 500 MG tablet Take 1,000 mg by mouth daily       Omega-3 Fatty Acids (OMEGA-3 FISH OIL) 1200 MG CAPS Take 2,400 mg by mouth daily With  mg,  mg       Magnesium 100 MG TABS Take 100 mg by mouth daily       saw palmetto 160 MG capsule Take 160 mg by mouth daily      Vitamin Mixture (VITAMIN E-SELENIUM) 400-50 UNIT-MCG CAPS Take 2 tablets by mouth daily With grape seed extract 38 mg      B Complex Vitamins (B COMPLEX 1 PO) Take 3 tablets by mouth daily shaklee supplement B-complex (high potency B vitamins with folic acid)      NONFORMULARY Take 1 tablet by mouth 2 times daily as needed          Allergies:  Amlodipine, Ciprofloxacin, Naprosyn [naproxen], Other, Lisinopril, and Red dye    Social History:    reports that he quit smoking about 39 years ago. He has never used smokeless tobacco. He reports that he does not drink alcohol and does not use drugs. Family History:       Problem Relation Age of Onset    Cancer Mother 76        lung ca    Cancer Father 70        bowel ca    Cancer Sister     Cancer Brother        Review of systems:    Unable to obtain ROS due to severe confusion    Vitals:   Vitals:    01/13/22 1545   BP: (!) 96/58   Pulse: 90   Resp: 22   Temp: 98 °F (36.7 °C)   SpO2: 93%      BMI: Body mass index is 25.01 kg/m².     Physical Exam:  General Appearance: confused, moderate  acute distress  Cardiovascular: normal rate, regular rhythm, normal S1 and S2, 2/6 murmurs  Pulmonary/Chest: bilateral rhonchi  Abdomen: soft, non-tender, non-distended, normal bowel sounds  Extremities: no cyanosis, clubbing or edema  Skin: warm and dry, no rash or erythema  Head: normocephalic and atraumatic  Eyes: pupils equal, round, and reactive to light  Neck:  no thyromegaly   Musculoskeletal:  no joint swelling, deformity or tenderness    Review of Labs and Diagnostic Testing:    Recent Results (from the past 24 hour(s))   Protime-INR    Collection Time: 01/13/22  4:00 AM   Result Value Ref Range    Protime 20.2 (H) 11.5 - 14.2 sec    INR 1.8          Assessment/ Plan:    1.  COVID Pneumonia -worsening clinically and her chest x-ray.  Started on IV Decadron 10 mg daily  1/6/22 and increased to 20 mg on 1/8/22 secondary to the increase in oxygen requirements.  Received IV Actemra on 1/6/22.  Outside the window for Remdesivir.  Evaluated by ID  and recommended to decrease Decadron to 6 mg/day 1/10.   2.  Acute hypoxic respiratory failure - Started out with high flow nasal cannula but advanced to Airvo on 1/8/22, requiring 90% of FiO2.  Using BiPAP with FiO2 90%.  Patient is DNR CCA.    Patient's worsening respiratory status was discussed with family. At this time is becoming very difficult to oxygenate him with BiPAP. In addition, he is fighting BiPAP and requires sedation which makes him more confused. Family members wish to keep the patient comfortable and were agreeable for hospice care. Hospice team was consulted and evaluated the patient. Patient was admitted to hospice service inpatient. 3. Altered mental status, confusion -patient is on as needed Haldol and morphine. Did not do well with Ativan. 4.  Paroxysmal atrial fib with RVR -patient received 3 doses IV Lopressor on 1/12 with conversion to NSR.  Lopressor 25 mg twice daily Appreciate Dr. Danette Wilson consult. Continue on Coumadin with pharmacy helping to manage dose. Had supratherapeutic INR for many days and did not receive Coumadin. On 1/11  was treated with po vitamin K 2 mg due to hematuria. Continue monitoring INR. 5.  H/O CVA which occurred after patient stopped coumadin. 6.  HTN - blood pressure periodically elevated.  Ordered as needed IV hydralazine  7.  BPH       Patient's family requested comfort care measures only and BiPAP to be removed. We increased Morphine frequency to every one hour to keep pt comfortable. Prognosis very poor.      Advanced Care Plan    ( x)  I confirmed that the patient's Advanced Care Plan is present, code status documented, or surrogate decision maker is listed in the patient's medical record. ( )  The patient's advanced care plan is not present because:  (select)   ( ) I confirmed today that the patient does not wish or was not able to name a surrogate decision maker or provide an [address] ( ) Hospice care is currently being provided or has been provided this calender year. ( )  I did not confirm today the presence of an [address] or surrogate decision maker documented within the patient's medical record. (Does not satisfy MIPS performance). Documentation of Current Medications in the Medical Record    (x )  I have utilized all available immediate resources to obtain, update, or review the patient's current medications. If Yes, Stop Here  ( ) The patient is not eligible for medications reconciliation; the patient is in an emergent medical situation where delaying treatment would jeopardize the patient's health. ( ) I did not confirm, update or review the patient's current list of medications today.   (does not satisfy MIPS performance)        Electronically signed by Dionisio Richardson MD on 1/13/2022 at 6:44 PM

## 2022-01-13 NOTE — PROGRESS NOTES
1810 no respirations noted. No audible heart tones. Supervisor notified. 1815 Dr. Chris Goodman in room to pronounce patient. Hospice called per Supervisor     Hospice notified of patient's death.

## 2022-01-13 NOTE — PLAN OF CARE
Problem: Gas Exchange - Impaired  Goal: Absence of hypoxia  1/13/2022 1025 by Yann Martinez RN  Outcome: Ongoing  1/13/2022 0343 by Linh Blevins RN  Outcome: Ongoing     Problem: Gas Exchange - Impaired  Goal: Promote optimal lung function  Outcome: Ongoing

## 2022-01-13 NOTE — PROGRESS NOTES
Clinical Pharmacy Note    Warfarin consult follow-up. Date  Warfarin Dose Given  1/13/2022  mg          INR (no units)   Date Value   01/13/2022 1.8   01/12/2022 2.7   01/11/2022 4.0   01/10/2022 4.3   01/09/2022 5.3 (HH)   01/08/2022 5.5 (HH)   01/07/2022 4.2       Hemoglobin (g/dL)   Date Value   01/09/2022 14.5   01/06/2022 14.4   10/06/2021 15.0     Hematocrit (%)   Date Value   01/09/2022 44.2   01/06/2022 42.7   10/06/2021 44.7     Platelet Count (k/uL)   Date Value   09/29/2011 169     Platelets (k/uL)   Date Value   01/09/2022 190   01/06/2022 113 (L)   10/06/2021 177         Potential Warfarin Drug-Drug Interactions:  Current drug-drug interactions: no changes (vitamin K 2mg po given on 1/11/22)  Discontinued drug-drug interactions: no changes      Plan: patient to take warfarin 9mg po today. Patient held warfarin 5 days and has received vitamin K 2mg po on 1/11/22. Daily PT/INR ordered until stable and therapeutic. Thank you for the consult. If you have any questions, [place] [phone].   Shae Marcum, R.Ph., 1/13/2022,8:00 AM

## 2022-01-13 NOTE — PROGRESS NOTES
Orestes Breen is resting in bed. States that he is unable to breathe at this time. SpO2 is 100% on bi-pap. Discussed with attending. Morphine increased to 2 mg every 2 hours for respiratory distress.

## 2022-01-13 NOTE — PROGRESS NOTES
Medicated with morphine per family request for resp distress. POX remains 37-40, 's. Sister Marylee Apgar in room with family. Family feels that morphine is keeping him comfortable.

## 2022-01-13 NOTE — PROGRESS NOTES
[place]. Discussed patients condition. Ez states that Orestes Breen told her a few days ago that he was \"going to be with the [employer]. \" Ez is appropriately tearful. Discussed hospice and comfort care. Spouse prefers [facility] hospice care. Discussed code status. She is agreeable at this time to a DNR-CC. Referral made to [facility] at this time. Discussed with provider. [facility] hospice calls back. States that they will be here between 2 and 230 to meet with family. Wife returns phone call and questions visitation policy. Questions referred to supervisor.

## 2022-01-14 NOTE — PROGRESS NOTES
15:50 Multiple family members at bedside with pt. Per family's request placed pt on a 4L O2 NC. Pt was uncomfortable with this NC and requested it to be removed. Family was in agreeance with the pt and requested that the NC be removed and pt to be on RA.

## 2022-01-14 NOTE — DISCHARGE SUMMARY
Hospitalist Discharge Summary    Patient:  Amy Shin  YOB: 1940    MRN: [id number]   Acct: [de-identified]    Primary Care Physician: Tere Anton    Admit date:  2022    Discharge date:  2022       Discharge Diagnoses/death summary  Cause of death    1. Acute hypoxemic respiratory failure due to COVID-19 pneumonia  2. COVID-19 pneumonia    Patient is a 12-year-old man who initially presented to the emergency room complaining of shortness of breath, cough, generalized malaise. He developed symptoms after being exposed to his daughter who contracted COVID 19 infection 3 weeks prior. The patient's work-up in the emergency room revealed oxygen saturation 64% on room air. Chest x-ray revealed evidence of bilateral multifocal pneumonia consistent with viral pneumonia. The patient was admitted and treated with IV Decadron, Actemra, oxygen supplement. Unfortunately his pneumonia progressively became worse and his oxygenation demand went up. The patient and his wife were very firm that he would not want to be intubated in the event of worsening respiratory status. The patient was requiring more and more oxygen and eventually was transitioned to BiPAP with FiO2 90%. Repeat chest x-ray revealed worsening bilateral pneumonia. Patient's condition and poor prognosis was discussed with his wife and family members. Patient and family decided to proceed with comfort care. Inpatient hospice was consulted to manage the patient. He received IV morphine to keep him comfortable. Family members requested to remove BiPAP and manage patient's restlessness with frequent IV morphine. At 1810 the patient noted no respirations and no audible heart tones. ER physician Dr. Sal Rivas  was notified and upon examining the patient pronounced him dead at 181. Patient's body was moved to  home of choice.         Electronically signed by Damaris Raines MD on 2022 at 7:20 AM  Discharging Hospitalist

## 2022-01-14 NOTE — PROGRESS NOTES
Family left with pt belongings. Emotional support given. Post mortem care provided. [facility] home here to take pt. Escorted out with  flag.

## 2023-10-31 NOTE — PROGRESS NOTES
Fingerstick INR drawn per clinic protocol. [facility] is accompanied by his wife, Vidya Valerio, today. Patient states no visible blood in urine and no black tarry stool. Denies any missed doses of warfarin. No change in diet, Vidya Valerio states they continue to eat spinach out of the garden. Vidya [facility] did stop lisinopril on 5/28 because of a rash, that he states has went away and reports home bp= \"143/80. \" Since Diego's INR remains therapeutic, we will continue current weekly warfarin regimen and recheck INR in 6 week(s). Patient acknowledges working in consult agreement with pharmacist as referred by his/her physician.           CLINICAL PHARMACY CONSULT: MED RECONCILIATION/REVIEW ADDENDUM    For Pharmacy Admin Tracking Only    PHSO: No  Total # of Interventions Recommended: 0  - Maintenance Safety Lab Monitoring #: 1  Total Interventions Accepted: 0  Time Spent (min): 400 [facility], [address] [facility]
I have personally seen and examined the patient. I have collaborated with and supervised the